# Patient Record
Sex: MALE | Race: WHITE | NOT HISPANIC OR LATINO | Employment: OTHER | URBAN - METROPOLITAN AREA
[De-identification: names, ages, dates, MRNs, and addresses within clinical notes are randomized per-mention and may not be internally consistent; named-entity substitution may affect disease eponyms.]

---

## 2018-03-13 LAB
HBA1C MFR BLD: 5.5 % (ref 0–5.7)
TSH SERPL-ACNC: 0.16 UIU/ML (ref 0.35–4.94)

## 2018-03-14 LAB
CHOLEST SERPL-MCNC: 133 MG/DL (ref 100–199)
HDLC SERPL-MCNC: 37 MG/DL
LDLC SERPL CALC-MCNC: 79 MG/DL
NONHDLC SERPL-MCNC: 96 MG/DL
TRIGL SERPL-MCNC: 86 MG/DL

## 2019-04-06 ENCOUNTER — TRANSFERRED RECORDS (OUTPATIENT)
Dept: MULTI SPECIALTY CLINIC | Facility: CLINIC | Age: 49
End: 2019-04-06

## 2019-04-06 LAB
ALT SERPL-CCNC: 11 IU/L (ref 8–45)
AST SERPL-CCNC: 16 IU/L (ref 2–40)
CREAT SERPL-MCNC: 1.74 MG/DL (ref 0.72–1.25)
GFR SERPL CREATININE-BSD FRML MDRD: 42 ML/MIN/1.73M2
GLUCOSE SERPL-MCNC: 123 MG/DL (ref 65–100)
INR PPP: 1.1
POTASSIUM SERPL-SCNC: 4.4 MMOL/L (ref 3.5–5)

## 2019-08-13 ENCOUNTER — NURSE TRIAGE (OUTPATIENT)
Dept: NURSING | Facility: CLINIC | Age: 49
End: 2019-08-13

## 2019-08-13 ENCOUNTER — TELEPHONE (OUTPATIENT)
Dept: FAMILY MEDICINE | Facility: CLINIC | Age: 49
End: 2019-08-13

## 2019-08-13 NOTE — TELEPHONE ENCOUNTER
Pt called requesting appt to fill his medications.  [facility] scheduled are full. Made pt appt at the [facility]. [facility].  Pt said he has had 2 strokes in the past and can't remember a lot. Pt did give me his Ex wifes number Bella [phone].  Pt said he did not have a number but was using a friends phone [phone]  Need records from other sites as to what meds he is on and medical History.    Pt said he gets his medications from [facility].  Justin Pharmacist said last medications filled with them was in January from [facility] [phone] & [phone]    [facility]

## 2019-08-13 NOTE — TELEPHONE ENCOUNTER
Bella returning a phone call from Chandrika from the clinic about filling his medications. They were able to get him an appointment tomorrow at 1240 and Bella will try very hard to make the appointment to be able to give a good history and fill in release of information papers for him. Bella gave her number and said that if they needed information or help with medical things they could call.    Lilia Osorio RN/ [facility]        Reason for Disposition    General information question, no triage required and triager able to answer question    Protocols used: INFORMATION ONLY CALL-A-

## 2019-08-15 ENCOUNTER — OFFICE VISIT (OUTPATIENT)
Dept: FAMILY MEDICINE | Facility: CLINIC | Age: 49
End: 2019-08-15
Payer: COMMERCIAL

## 2019-08-15 VITALS
SYSTOLIC BLOOD PRESSURE: 196 MMHG | TEMPERATURE: 98.2 F | WEIGHT: 186.8 LBS | BODY MASS INDEX: 26.74 KG/M2 | HEIGHT: 70 IN | OXYGEN SATURATION: 95 % | HEART RATE: 101 BPM | DIASTOLIC BLOOD PRESSURE: 104 MMHG | RESPIRATION RATE: 18 BRPM

## 2019-08-15 DIAGNOSIS — F32.A DEPRESSION, UNSPECIFIED DEPRESSION TYPE: ICD-10-CM

## 2019-08-15 DIAGNOSIS — Z86.73 HISTORY OF MULTIPLE STROKES: Primary | ICD-10-CM

## 2019-08-15 DIAGNOSIS — I10 POORLY-CONTROLLED HYPERTENSION: ICD-10-CM

## 2019-08-15 DIAGNOSIS — Q61.3 PKD (POLYCYSTIC KIDNEY DISEASE): ICD-10-CM

## 2019-08-15 PROBLEM — I63.9 ACUTE CVA (CEREBROVASCULAR ACCIDENT) (H): Status: ACTIVE | Noted: 2017-03-22

## 2019-08-15 LAB
ALBUMIN SERPL-MCNC: 3.5 G/DL (ref 3.4–5)
ALP SERPL-CCNC: 80 U/L (ref 40–150)
ALT SERPL W P-5'-P-CCNC: 19 U/L (ref 0–70)
ANION GAP SERPL CALCULATED.3IONS-SCNC: 5 MMOL/L (ref 3–14)
AST SERPL W P-5'-P-CCNC: 23 U/L (ref 0–45)
BILIRUB SERPL-MCNC: 0.5 MG/DL (ref 0.2–1.3)
BUN SERPL-MCNC: 31 MG/DL (ref 7–30)
CALCIUM SERPL-MCNC: 8.9 MG/DL (ref 8.5–10.1)
CHLORIDE SERPL-SCNC: 105 MMOL/L (ref 94–109)
CHOLEST SERPL-MCNC: 157 MG/DL
CO2 SERPL-SCNC: 27 MMOL/L (ref 20–32)
CREAT SERPL-MCNC: 1.95 MG/DL (ref 0.66–1.25)
ERYTHROCYTE [DISTWIDTH] IN BLOOD BY AUTOMATED COUNT: 13.1 % (ref 10–15)
GFR SERPL CREATININE-BSD FRML MDRD: 39 ML/MIN/{1.73_M2}
GLUCOSE SERPL-MCNC: 107 MG/DL (ref 70–99)
HCT VFR BLD AUTO: 50.1 % (ref 40–53)
HDLC SERPL-MCNC: 54 MG/DL
HGB BLD-MCNC: 16.9 G/DL (ref 13.3–17.7)
LDLC SERPL CALC-MCNC: 83 MG/DL
MCH RBC QN AUTO: 29.6 PG (ref 26.5–33)
MCHC RBC AUTO-ENTMCNC: 33.7 G/DL (ref 31.5–36.5)
MCV RBC AUTO: 88 FL (ref 78–100)
NONHDLC SERPL-MCNC: 103 MG/DL
PLATELET # BLD AUTO: 320 10E9/L (ref 150–450)
POTASSIUM SERPL-SCNC: 4.4 MMOL/L (ref 3.4–5.3)
PROT SERPL-MCNC: 7.6 G/DL (ref 6.8–8.8)
RBC # BLD AUTO: 5.71 10E12/L (ref 4.4–5.9)
SODIUM SERPL-SCNC: 137 MMOL/L (ref 133–144)
TRIGL SERPL-MCNC: 102 MG/DL
WBC # BLD AUTO: 9 10E9/L (ref 4–11)

## 2019-08-15 PROCEDURE — 80053 COMPREHEN METABOLIC PANEL: CPT | Performed by: FAMILY MEDICINE

## 2019-08-15 PROCEDURE — 85027 COMPLETE CBC AUTOMATED: CPT | Performed by: FAMILY MEDICINE

## 2019-08-15 PROCEDURE — 36415 COLL VENOUS BLD VENIPUNCTURE: CPT | Performed by: FAMILY MEDICINE

## 2019-08-15 PROCEDURE — 80061 LIPID PANEL: CPT | Performed by: FAMILY MEDICINE

## 2019-08-15 PROCEDURE — 99204 OFFICE O/P NEW MOD 45 MIN: CPT | Performed by: FAMILY MEDICINE

## 2019-08-15 RX ORDER — LISINOPRIL 40 MG/1
40 TABLET ORAL DAILY
Refills: 0 | COMMUNITY
Start: 2019-01-25 | End: 2019-08-15

## 2019-08-15 RX ORDER — ASPIRIN 81 MG/1
81 TABLET ORAL DAILY
Qty: 90 TABLET | Refills: 3 | Status: SHIPPED | OUTPATIENT
Start: 2019-08-15 | End: 2022-02-02

## 2019-08-15 RX ORDER — ESCITALOPRAM OXALATE 10 MG/1
10 TABLET ORAL DAILY
Qty: 90 TABLET | Refills: 1 | Status: SHIPPED | OUTPATIENT
Start: 2019-08-15 | End: 2021-05-21

## 2019-08-15 RX ORDER — CLOPIDOGREL BISULFATE 75 MG/1
75 TABLET ORAL DAILY
COMMUNITY
End: 2019-08-15

## 2019-08-15 RX ORDER — ASPIRIN 81 MG/1
81 TABLET ORAL DAILY
COMMUNITY
Start: 2017-04-23 | End: 2019-08-15

## 2019-08-15 RX ORDER — LISINOPRIL 40 MG/1
40 TABLET ORAL DAILY
Qty: 90 TABLET | Refills: 3 | Status: SHIPPED | OUTPATIENT
Start: 2019-08-15 | End: 2021-01-28

## 2019-08-15 RX ORDER — ATORVASTATIN CALCIUM 40 MG/1
1 TABLET, FILM COATED ORAL EVERY EVENING
Refills: 0 | COMMUNITY
Start: 2019-01-25 | End: 2019-08-15

## 2019-08-15 RX ORDER — AMLODIPINE BESYLATE 5 MG/1
10 TABLET ORAL EVERY EVENING
Refills: 0 | COMMUNITY
Start: 2019-01-25 | End: 2019-08-15

## 2019-08-15 RX ORDER — AMLODIPINE BESYLATE 5 MG/1
10 TABLET ORAL EVERY EVENING
Qty: 180 TABLET | Refills: 3 | Status: CANCELLED | OUTPATIENT
Start: 2019-08-15

## 2019-08-15 RX ORDER — CLOPIDOGREL BISULFATE 75 MG/1
75 TABLET ORAL DAILY
Qty: 90 TABLET | Refills: 3 | Status: SHIPPED | OUTPATIENT
Start: 2019-08-15 | End: 2021-01-28

## 2019-08-15 RX ORDER — CARVEDILOL 25 MG/1
25 TABLET ORAL 2 TIMES DAILY
Qty: 90 TABLET | Refills: 3 | Status: SHIPPED | OUTPATIENT
Start: 2019-08-15 | End: 2019-08-15

## 2019-08-15 RX ORDER — CARVEDILOL 25 MG/1
25 TABLET ORAL 2 TIMES DAILY
Qty: 90 TABLET | Refills: 1
Start: 2019-08-15 | End: 2021-01-28

## 2019-08-15 RX ORDER — AMLODIPINE BESYLATE 10 MG/1
10 TABLET ORAL DAILY
Qty: 90 TABLET | Refills: 1 | Status: SHIPPED | OUTPATIENT
Start: 2019-08-15 | End: 2020-03-24

## 2019-08-15 RX ORDER — ATORVASTATIN CALCIUM 40 MG/1
40 TABLET, FILM COATED ORAL EVERY EVENING
Qty: 90 TABLET | Refills: 3 | Status: SHIPPED | OUTPATIENT
Start: 2019-08-15 | End: 2021-01-28

## 2019-08-15 RX ORDER — CARVEDILOL 25 MG/1
25 TABLET ORAL 2 TIMES DAILY
COMMUNITY
Start: 2019-01-25 | End: 2019-08-15

## 2019-08-15 ASSESSMENT — PATIENT HEALTH QUESTIONNAIRE - PHQ9: SUM OF ALL RESPONSES TO PHQ QUESTIONS 1-9: 10

## 2019-08-15 ASSESSMENT — MIFFLIN-ST. JEOR: SCORE: 1718.57

## 2019-08-15 NOTE — NURSING NOTE
"Chief Complaint   Patient presents with     Cerebrovascular Accident     Establish Care       Initial BP (!) 196/104   Pulse 101   Temp 98.2  F (36.8  C)   Resp 18   Ht 1.778 m (5' 10\")   Wt 84.7 kg (186 lb 12.8 oz)   SpO2 95%   BMI 26.80 kg/m   Estimated body mass index is 26.8 kg/m  as calculated from the following:    Height as of this encounter: 1.778 m (5' 10\").    Weight as of this encounter: 84.7 kg (186 lb 12.8 oz).    Patient presents to the clinic using No DME    Health Maintenance that is potentially due pending provider review:  Lipid profile done 3/4/2018    Sent to abstracting found on care everywhere.    Is there anyone who you would like to be able to receive your results? Yes  If yes have patient fill out CAROLYN    "

## 2019-08-15 NOTE — Clinical Note
Please abstract the following data from this visit with this patient into the appropriate field in Epic:Lipid profile done 3/4/2018 found on care everywhere

## 2019-08-15 NOTE — LETTER
August 16, 2019      Salty Beasley  [address]  [place] [facility] [address]        Dear ,    We are writing to inform you of your test results.    Kidney function at around baseline low.  Other lab results came back unremarkable. Would recommend to continue current medications.  Continue well hydration. Let us know if there are any questions.     Resulted Orders   CBC with platelets   Result Value Ref Range    WBC 9.0 4.0 - 11.0 10e9/L    RBC Count 5.71 4.4 - 5.9 10e12/L    Hemoglobin 16.9 13.3 - 17.7 g/dL    Hematocrit 50.1 40.0 - 53.0 %    MCV 88 78 - 100 fl    MCH 29.6 26.5 - 33.0 pg    MCHC 33.7 31.5 - 36.5 g/dL    RDW 13.1 10.0 - 15.0 %    Platelet Count 320 150 - 450 10e9/L   Comprehensive metabolic panel (BMP + Alb, Alk Phos, ALT, AST, Total. Bili, TP)   Result Value Ref Range    Sodium 137 133 - 144 mmol/L    Potassium 4.4 3.4 - 5.3 mmol/L    Chloride 105 94 - 109 mmol/L    Carbon Dioxide 27 20 - 32 mmol/L    Anion Gap 5 3 - 14 mmol/L    Glucose 107 (H) 70 - 99 mg/dL      Comment:      Fasting specimen    Urea Nitrogen 31 (H) 7 - 30 mg/dL    Creatinine 1.95 (H) 0.66 - 1.25 mg/dL    GFR Estimate 39 (L) >60 mL/min/[1.73_m2]      Comment:      Non  GFR Calc  Starting 12/18/2018, serum creatinine based estimated GFR (eGFR) will be   calculated using the Chronic Kidney Disease Epidemiology Collaboration   (CKD-EPI) equation.      GFR Estimate If Black 45 (L) >60 mL/min/[1.73_m2]      Comment:       GFR Calc  Starting 12/18/2018, serum creatinine based estimated GFR (eGFR) will be   calculated using the Chronic Kidney Disease Epidemiology Collaboration   (CKD-EPI) equation.      Calcium 8.9 8.5 - 10.1 mg/dL    Bilirubin Total 0.5 0.2 - 1.3 mg/dL    Albumin 3.5 3.4 - 5.0 g/dL    Protein Total 7.6 6.8 - 8.8 g/dL    Alkaline Phosphatase 80 40 - 150 U/L    ALT 19 0 - 70 U/L    AST 23 0 - 45 U/L   Lipid panel   Result Value Ref Range    Cholesterol 157 <200 mg/dL    Triglycerides 102  <150 mg/dL      Comment:      Fasting specimen    HDL Cholesterol 54 >39 mg/dL    LDL Cholesterol Calculated 83 <100 mg/dL      Comment:      Desirable:       <100 mg/dl    Non HDL Cholesterol 103 <130 mg/dL       If you have any questions or concerns, please call the clinic at the number listed above.       Sincerely,        Jacobo Bradford MD/[facility]

## 2019-08-15 NOTE — NURSING NOTE
"Chief Complaint   Patient presents with     Cerebrovascular Accident     Establish Care       Initial BP (!) 196/104   Pulse 101   Temp 98.2  F (36.8  C)   Resp 18   Ht 1.778 m (5' 10\")   Wt 84.7 kg (186 lb 12.8 oz)   SpO2 95%   BMI 26.80 kg/m   Estimated body mass index is 26.8 kg/m  as calculated from the following:    Height as of this encounter: 1.778 m (5' 10\").    Weight as of this encounter: 84.7 kg (186 lb 12.8 oz).    Patient presents to the clinic using No DME    Health Maintenance that is potentially due pending provider review:  NONE    n/a    Is there anyone who you would like to be able to receive your results? yes  If yes have patient fill out CAROLYN    "

## 2019-08-15 NOTE — PROGRESS NOTES
SUBJECTIVE   Salty Beasley is a 48 year old male who presents with     New Patient/Transfer of Care  Cerebrovascular Follow-up      Patient history: ischemic cerebrovascular incident    Residual symptoms: Difficult walking, Confusion, Difficulty speaking, Slurred speech and crying spells    Worsened or new symptoms since last visit: No    Daily aspirin use: No, he used to take an aspirin daily but has run out 1 week ago    Hypertension controlled: No ran out of medication 1+ week ago        How many servings of fruits and vegetables do you eat daily?  2-3    On average, how many sweetened beverages do you drink each day (soda, juice, sweet tea, etc)?   0  How many days per week do you miss taking your medication? Ran out of medication 1 + week ago    What makes it hard for you to take your medications?  because could not get refilled until had a visit     Patient lost his home 1 year ago and now is living in a camper on a piece of property that he had already paid for    Does not have a cell phone    Does not drive, got a ride from brother    Does not have a medidametrics worker, has been trying to live on the money he has had  Head CT 7/12/2017 done a [facility]:  consistent with small vessel ischemic disease as well as old bilateral basal ganglia lacunar infarcts      Multiple strokes was recorded due to poorly controlled hypertension as per neurology.  He had carotid US, Echocardiogram with bubble study and normal cardiac anatomy, no ASD or VSD.         PCP   No primary care provider on file. None    Health Maintenance        Health Maintenance Due   Topic Date Due     PREVENTIVE CARE VISIT  1969     HIV SCREENING  11/07/1984     LIPID  11/07/2004     PHQ-2  01/01/2019       HPI        Patient Active Problem List   Diagnosis     Acute CVA (cerebrovascular accident) (H)     Current Outpatient Medications   Medication     amLODIPine (NORVASC) 10 MG tablet     amLODIPine (NORVASC) 5 MG tablet     aspirin 81 MG EC  tablet     atorvastatin (LIPITOR) 40 MG tablet     carvedilol (COREG) 25 MG tablet     clopidogrel (PLAVIX) 75 MG tablet     IBUPROFEN 200 200 MG OR TABS     lisinopril (PRINIVIL/ZESTRIL) 40 MG tablet     No current facility-administered medications for this visit.        Patient Active Problem List   Diagnosis     Acute CVA (cerebrovascular accident) (H)     Past Surgical History:   Procedure Laterality Date     SURGICAL HISTORY OF -       rt fx ankle age 11       Social History     Tobacco Use     Smoking status: Never Smoker     Smokeless tobacco: Never Used   Substance Use Topics     Alcohol use: Not Currently     Comment: occ     Family History   Problem Relation Age of Onset     Genitourinary Problems Mother         polycystic kidney     Hypertension Mother      Diabetes Mother      Heart Disease Mother         CHF     Cancer Father         bladder cancer     Heart Disease Maternal Grandmother      Cerebrovascular Disease Maternal Grandfather      Breast Cancer Paternal Grandmother      Hypertension Paternal Grandmother      Diabetes Paternal Grandmother          Current Outpatient Medications   Medication Sig Dispense Refill     amLODIPine (NORVASC) 10 MG tablet Take 1 tablet (10 mg) by mouth daily 90 tablet 1     amLODIPine (NORVASC) 5 MG tablet 10 mg every evening  0     aspirin 81 MG EC tablet Take 1 tablet (81 mg) by mouth daily 90 tablet 3     atorvastatin (LIPITOR) 40 MG tablet Take 1 tablet (40 mg) by mouth every evening 90 tablet 3     carvedilol (COREG) 25 MG tablet Take 1 tablet (25 mg) by mouth 2 times daily 90 tablet 3     clopidogrel (PLAVIX) 75 MG tablet Take 1 tablet (75 mg) by mouth daily 90 tablet 3     IBUPROFEN 200 200 MG OR TABS 3 tabs q 6-8 hrs prn       lisinopril (PRINIVIL/ZESTRIL) 40 MG tablet Take 1 tablet (40 mg) by mouth daily 90 tablet 3     No Known Allergies  No lab results found.   BP Readings from Last 3 Encounters:   08/15/19 (!) 196/104   01/22/08 130/90    Wt Readings from  "Last 3 Encounters:   08/15/19 84.7 kg (186 lb 12.8 oz)   01/22/08 74.4 kg (164 lb)                 Reviewed and updated:  Tobacco  Allergies  Meds  Med Hx  Surg Hx  Fam Hx  Soc Hx     ROS:  Constitutional, neuro, ENT, endocrine, pulmonary, cardiac, gastrointestinal, genitourinary, musculoskeletal, integument and psychiatric systems are negative, except as otherwise noted.    PHYSICAL EXAM   BP (!) 196/104   Pulse 101   Temp 98.2  F (36.8  C)   Resp 18   Ht 1.778 m (5' 10\")   Wt 84.7 kg (186 lb 12.8 oz)   SpO2 95%   BMI 26.80 kg/m    Body mass index is 26.8 kg/m .  GENERAL: alert and over weight  EYES: Eyes grossly normal to inspection, PERRL and conjunctivae and sclerae normal  NECK: no adenopathy, no asymmetry, masses, or scars and thyroid normal to palpation  RESP: lungs clear to auscultation - no rales, rhonchi or wheezes  CV: regular rate and rhythm, normal S1 S2, no S3 or S4, no murmur, click or rub, no peripheral edema and peripheral pulses strong  ABDOMEN: soft, nontender, no hepatosplenomegaly, no masses and bowel sounds normal  MS: no gross musculoskeletal defects noted, no edema  SKIN: no suspicious lesions or rashes  NEURO: Normal strength and tone, mentation intact and speech normal  PSYCH: tearful and anxious    Assessment & Plan     (Z86.73) History of multiple strokes  (primary encounter diagnosis)  Comment: Continue Plavix, aspirin and Lipitor.  Neurology referral placed  Plan: clopidogrel (PLAVIX) 75 MG tablet, atorvastatin        (LIPITOR) 40 MG tablet, aspirin 81 MG EC         tablet, NEUROLOGY ADULT REFERRAL            (I10) Poorly-controlled hypertension  Comment: Ran out of medications about a week ago, better compliance stressed.  Lisinopril, amlodipine and carvedilol refilled. Healthy lifestyle modifications stressed including regular walks, balanced diet, weight loss and limiting salt/caffeine/pop intake  Plan: lisinopril (PRINIVIL/ZESTRIL) 40 MG tablet,         amLODIPine " "(NORVASC) 10 MG tablet, CBC with         platelets, Comprehensive metabolic panel (BMP +        Alb, Alk Phos, ALT, AST, Total. Bili, TP),         carvedilol (COREG) 25 MG tablet, Lipid panel,         DISCONTINUED: carvedilol (COREG) 25 MG tablet            (Q61.3) PKD (polycystic kidney disease)  Comment: Known to have polycystic kidney disease, CKD stage III.  Nephrology consult placed, will continue monitoring renal functions periodically  Plan: NEPHROLOGY ADULT REFERRAL      (F32.9) Depression, unspecified depression type  PHQ-9 SCORE 8/15/2019   PHQ-9 Total Score 10     Comment: Appears quite anxious and tearful, PHQ 9 score 10.  Suggested to try Lexapro, common side effects discussed  Plan: escitalopram (LEXAPRO) 10 MG tablet            BMI:   Estimated body mass index is 26.8 kg/m  as calculated from the following:    Height as of this encounter: 1.778 m (5' 10\").    Weight as of this encounter: 84.7 kg (186 lb 12.8 oz).   Weight management plan: Discussed healthy diet and exercise guidelines        Patient Instructions     Patient Education     Low-Salt Choices  Eating salt (sodium) can make your body retain too much water. Excess water makes your heart work harder. Canned, packaged, and frozen foods are easy to prepare. But they are often high in sodium. Here are some ideas for low-salt foods you can easily make yourself.    For breakfast    Fruit or 100% fruit juice    Whole-wheat bread or an English muffin. Look for sodium content on Nutrition Facts labels.    Low-fat milk or yogurt    Unsalted eggs    Shredded wheat    Corn tortillas    Unsalted steamed rice    Regular (not instant) hot cereal, made without salt  Stay away from:    Sausage, morales, and ham    Flour tortillas    Packaged muffins, pancakes, and biscuits    Instant hot cereals    Cottage cheese  For lunch and dinner    Fresh fish, chicken, turkey, or meat--baked, broiled, or roasted without salt    Dry beans, cooked without salt    Tofu, " stir-fried without salt    Unsalted fresh fruit and vegetables, or frozen or canned fruit and vegetables with no added salt  Stay away from:    Lunch or deli meat that is cured or smoked    Cheese    Tomato juice and ketchup    Canned vegetables, soups, and fish not labeled as no-salt-added or reduced sodium    Packaged gravies and sauces    Olives, pickles, and relish    Bottled salad dressings  For snacks and desserts    Yogurt    Unsalted, air-popped popcorn    Unsalted nuts or seeds  Stay away from:    Pies and cakes    Packaged dessert mixes    Pizza    Canned and packaged puddings    Pretzels, chips, crackers, and nuts--unless the label says unsalted  Date Last Reviewed: 6/1/2017 2000-2018 [employer]. [address] [place] [address]. All rights reserved. This information is not intended as a substitute for professional medical care. Always follow your healthcare professional's instructions.           Patient Education     Depression  Depression is one of the most common mental health problems today. It is not just a state of unhappiness or sadness. It is a true disease. The cause seems to be related to a decrease in chemicals that transmit signals in the brain. Having a family history of depression, alcoholism, or suicide increases the risk. Chronic illness, chronic pain, migraine headaches, and high emotional stress also increase the risk.  Depression is something we tend to recognize in others, but may have a hard time seeing in ourselves. It can show in many physical and emotional ways:    Loss of appetite    Overeating    Not being able to sleep    Sleeping too much    Tiredness not related to physical exertion    Restlessness or irritability    Slowness of movement or speech    Feeling depressed or withdrawn    Loss of interest in things you once enjoyed    Trouble concentrating, poor memory, trouble making decisions    Thoughts of harming or killing oneself, or thoughts that  life is not worth living    Low self-esteem  The treatment for depression may include both medicine and psychotherapy. Antidepressants can reduce suffering and can improve the ability to function during the depressed period. Therapy can offer emotional support and help you understand emotional factors that may be causing the depression.  Home care    Ongoing care and support help people manage this disease. Find a healthcare provider and therapist who meet your needs. Seek help when you feel like you may be getting ill.    Be kind to yourself. Make it a point to do things that you enjoy (gardening, walking in nature, going to a movie). Reward yourself for small successes.    Take care of your physical body. Eat a balanced diet (low in saturated fat and high in fruits and vegetables). Exercise at least 3 times a week for 30 minutes. Even mild-moderate exercise (like brisk walking) can make you feel better.    Don't drink alcohol, which can make depression worse.    Take medicine as prescribed.    Tell each of your healthcare providers about all of the prescription and over-the-counter medicines, vitamins, and supplements you take. Certain supplements interact with medicines and can result in dangerous side effects. Ask your pharmacist when you have questions about medicine interactions.    Talk with your family and trusted friends about your feelings and thoughts. Ask them to help you recognize behavior changes early so you can get help and, if needed, medicine can be adjusted.  Follow-up care  Follow up with your healthcare provider, or as advised.  Call 911  Call 911 if you:    Have suicidal thoughts, a suicide plan, and the means to carry out the plan; or serious thoughts of hurting someone else     Have trouble breathing    Are very confused    Feel very drowsy or have trouble awakening    Faint or lose consciousness    Have new chest pain that becomes more severe, lasts longer, or spreads into your shoulder,  arm, neck, jaw, or back  When to seek medical advice  Call your healthcare provider right away if any of these happen:    Feeling extreme depression, fear, anxiety, or anger toward yourself or others    Feeling out of control    Feeling that you may try to harm yourself or another    Hearing voices that others do not hear    Seeing things that others do not see    Can t sleep or eat for 3 days in a row    Friends or family express concern over your behavior and ask you to seek help  Date Last Reviewed: 10/1/2017    8339-1157 [employer]. [address] [place] [address]. All rights reserved. This information is not intended as a substitute for professional medical care. Always follow your healthcare professional's instructions.               Return in about 2 weeks (around 8/29/2019) for BP Recheck.        Jacobo Bradford MD  [facility]

## 2019-08-15 NOTE — PATIENT INSTRUCTIONS
Patient Education     Low-Salt Choices  Eating salt (sodium) can make your body retain too much water. Excess water makes your heart work harder. Canned, packaged, and frozen foods are easy to prepare. But they are often high in sodium. Here are some ideas for low-salt foods you can easily make yourself.    For breakfast    Fruit or 100% fruit juice    Whole-wheat bread or an English muffin. Look for sodium content on Nutrition Facts labels.    Low-fat milk or yogurt    Unsalted eggs    Shredded wheat    Corn tortillas    Unsalted steamed rice    Regular (not instant) hot cereal, made without salt  Stay away from:    Sausage, morales, and ham    Flour tortillas    Packaged muffins, pancakes, and biscuits    Instant hot cereals    Cottage cheese  For lunch and dinner    Fresh fish, chicken, turkey, or meat--baked, broiled, or roasted without salt    Dry beans, cooked without salt    Tofu, stir-fried without salt    Unsalted fresh fruit and vegetables, or frozen or canned fruit and vegetables with no added salt  Stay away from:    Lunch or deli meat that is cured or smoked    Cheese    Tomato juice and ketchup    Canned vegetables, soups, and fish not labeled as no-salt-added or reduced sodium    Packaged gravies and sauces    Olives, pickles, and relish    Bottled salad dressings  For snacks and desserts    Yogurt    Unsalted, air-popped popcorn    Unsalted nuts or seeds  Stay away from:    Pies and cakes    Packaged dessert mixes    Pizza    Canned and packaged puddings    Pretzels, chips, crackers, and nuts--unless the label says unsalted  Date Last Reviewed: 6/1/2017 2000-2018 [employer]. [address] [place] [address], [place] [address]. All rights reserved. This information is not intended as a substitute for professional medical care. Always follow your healthcare professional's instructions.           Patient Education     Depression  Depression is one of the most common mental health problems today.  It is not just a state of unhappiness or sadness. It is a true disease. The cause seems to be related to a decrease in chemicals that transmit signals in the brain. Having a family history of depression, alcoholism, or suicide increases the risk. Chronic illness, chronic pain, migraine headaches, and high emotional stress also increase the risk.  Depression is something we tend to recognize in others, but may have a hard time seeing in ourselves. It can show in many physical and emotional ways:    Loss of appetite    Overeating    Not being able to sleep    Sleeping too much    Tiredness not related to physical exertion    Restlessness or irritability    Slowness of movement or speech    Feeling depressed or withdrawn    Loss of interest in things you once enjoyed    Trouble concentrating, poor memory, trouble making decisions    Thoughts of harming or killing oneself, or thoughts that life is not worth living    Low self-esteem  The treatment for depression may include both medicine and psychotherapy. Antidepressants can reduce suffering and can improve the ability to function during the depressed period. Therapy can offer emotional support and help you understand emotional factors that may be causing the depression.  Home care    Ongoing care and support help people manage this disease. Find a healthcare provider and therapist who meet your needs. Seek help when you feel like you may be getting ill.    Be kind to yourself. Make it a point to do things that you enjoy (gardening, walking in nature, going to a movie). Reward yourself for small successes.    Take care of your physical body. Eat a balanced diet (low in saturated fat and high in fruits and vegetables). Exercise at least 3 times a week for 30 minutes. Even mild-moderate exercise (like brisk walking) can make you feel better.    Don't drink alcohol, which can make depression worse.    Take medicine as prescribed.    Tell each of your healthcare providers  about all of the prescription and over-the-counter medicines, vitamins, and supplements you take. Certain supplements interact with medicines and can result in dangerous side effects. Ask your pharmacist when you have questions about medicine interactions.    Talk with your family and trusted friends about your feelings and thoughts. Ask them to help you recognize behavior changes early so you can get help and, if needed, medicine can be adjusted.  Follow-up care  Follow up with your healthcare provider, or as advised.  Call 911  Call 911 if you:    Have suicidal thoughts, a suicide plan, and the means to carry out the plan; or serious thoughts of hurting someone else     Have trouble breathing    Are very confused    Feel very drowsy or have trouble awakening    Faint or lose consciousness    Have new chest pain that becomes more severe, lasts longer, or spreads into your shoulder, arm, neck, jaw, or back  When to seek medical advice  Call your healthcare provider right away if any of these happen:    Feeling extreme depression, fear, anxiety, or anger toward yourself or others    Feeling out of control    Feeling that you may try to harm yourself or another    Hearing voices that others do not hear    Seeing things that others do not see    Can t sleep or eat for 3 days in a row    Friends or family express concern over your behavior and ask you to seek help  Date Last Reviewed: 10/1/2017    [id number] [employer]. [address] [place] [address], [place] [address]. All rights reserved. This information is not intended as a substitute for professional medical care. Always follow your healthcare professional's instructions.

## 2019-08-29 ENCOUNTER — OFFICE VISIT (OUTPATIENT)
Dept: FAMILY MEDICINE | Facility: CLINIC | Age: 49
End: 2019-08-29
Payer: COMMERCIAL

## 2019-08-29 VITALS
TEMPERATURE: 97.8 F | SYSTOLIC BLOOD PRESSURE: 170 MMHG | HEIGHT: 70 IN | RESPIRATION RATE: 18 BRPM | BODY MASS INDEX: 26.63 KG/M2 | DIASTOLIC BLOOD PRESSURE: 110 MMHG | WEIGHT: 186 LBS | HEART RATE: 92 BPM

## 2019-08-29 DIAGNOSIS — Q61.3 PKD (POLYCYSTIC KIDNEY DISEASE): ICD-10-CM

## 2019-08-29 DIAGNOSIS — I10 POORLY-CONTROLLED HYPERTENSION: Primary | ICD-10-CM

## 2019-08-29 DIAGNOSIS — F15.10 DRUG ABUSE, AMPHETAMINE TYPE (H): ICD-10-CM

## 2019-08-29 DIAGNOSIS — N18.30 CKD (CHRONIC KIDNEY DISEASE) STAGE 3, GFR 30-59 ML/MIN (H): ICD-10-CM

## 2019-08-29 DIAGNOSIS — F32.A DEPRESSION, UNSPECIFIED DEPRESSION TYPE: ICD-10-CM

## 2019-08-29 LAB
ANION GAP SERPL CALCULATED.3IONS-SCNC: 5 MMOL/L (ref 3–14)
BUN SERPL-MCNC: 41 MG/DL (ref 7–30)
CALCIUM SERPL-MCNC: 9.2 MG/DL (ref 8.5–10.1)
CHLORIDE SERPL-SCNC: 106 MMOL/L (ref 94–109)
CO2 SERPL-SCNC: 28 MMOL/L (ref 20–32)
CREAT SERPL-MCNC: 1.86 MG/DL (ref 0.66–1.25)
GFR SERPL CREATININE-BSD FRML MDRD: 41 ML/MIN/{1.73_M2}
GLUCOSE SERPL-MCNC: 108 MG/DL (ref 70–99)
POTASSIUM SERPL-SCNC: 4.3 MMOL/L (ref 3.4–5.3)
SODIUM SERPL-SCNC: 139 MMOL/L (ref 133–144)

## 2019-08-29 PROCEDURE — 36415 COLL VENOUS BLD VENIPUNCTURE: CPT | Performed by: FAMILY MEDICINE

## 2019-08-29 PROCEDURE — 80048 BASIC METABOLIC PNL TOTAL CA: CPT | Performed by: FAMILY MEDICINE

## 2019-08-29 PROCEDURE — 99214 OFFICE O/P EST MOD 30 MIN: CPT | Performed by: FAMILY MEDICINE

## 2019-08-29 RX ORDER — CLONIDINE HYDROCHLORIDE 0.1 MG/1
TABLET ORAL
Qty: 28 TABLET | Refills: 1 | Status: SHIPPED | OUTPATIENT
Start: 2019-08-29 | End: 2020-03-24

## 2019-08-29 ASSESSMENT — MIFFLIN-ST. JEOR: SCORE: 1714.94

## 2019-08-29 NOTE — NURSING NOTE
"Chief Complaint   Patient presents with     Hypertension     BP (!) 170/110 (Cuff Size: Adult Regular)   Pulse 92   Temp 97.8  F (36.6  C) (Tympanic)   Resp 18   Ht 1.778 m (5' 10\")   Wt 84.4 kg (186 lb)   BMI 26.69 kg/m   Estimated body mass index is 26.69 kg/m  as calculated from the following:    Height as of this encounter: 1.778 m (5' 10\").    Weight as of this encounter: 84.4 kg (186 lb).  Patient presents to the clinic using No DME      Health Maintenance that is potentially due pending provider review:    Health Maintenance Due   Topic Date Due     PREVENTIVE CARE VISIT  1970     DEPRESSION ACTION PLAN  1970     HIV SCREENING  11/07/1984                "

## 2019-08-29 NOTE — PROGRESS NOTES
SUBJECTIVE   Salty Beasley is a 48 year old male who presents with     Hypertension Follow-up      Do you check your blood pressure regularly outside of the clinic? Yes     Are you following a low salt diet? Yes    Are your blood pressures ever more than 140 on the top number (systolic) OR more   than 90 on the bottom number (diastolic), for example 140/90? Yes  180/160- says his life is a mess right now. Lots of stuff going on.         How many days per week do you miss taking your medication? 0        PCP   Physician No Ref-Primary None    Health Maintenance        Health Maintenance Due   Topic Date Due     PREVENTIVE CARE VISIT  1970     DEPRESSION ACTION PLAN  1969     HIV SCREENING  11/07/1984       HPI        Patient Active Problem List   Diagnosis     Acute CVA (cerebrovascular accident) (H)     Poorly-controlled hypertension     History of multiple strokes     PKD (polycystic kidney disease)     Depression, unspecified depression type     Current Outpatient Medications   Medication     amLODIPine (NORVASC) 10 MG tablet     aspirin 81 MG EC tablet     atorvastatin (LIPITOR) 40 MG tablet     carvedilol (COREG) 25 MG tablet     clopidogrel (PLAVIX) 75 MG tablet     escitalopram (LEXAPRO) 10 MG tablet     IBUPROFEN 200 200 MG OR TABS     lisinopril (PRINIVIL/ZESTRIL) 40 MG tablet     No current facility-administered medications for this visit.        Patient Active Problem List   Diagnosis     Acute CVA (cerebrovascular accident) (H)     Poorly-controlled hypertension     History of multiple strokes     PKD (polycystic kidney disease)     Depression, unspecified depression type     CKD (chronic kidney disease) stage 3, GFR 30-59 ml/min (H)     Past Surgical History:   Procedure Laterality Date     SURGICAL HISTORY OF -       rt fx ankle age 11       Social History     Tobacco Use     Smoking status: Never Smoker     Smokeless tobacco: Never Used   Substance Use Topics     Alcohol use: Not Currently      Comment: occ     Family History   Problem Relation Age of Onset     Genitourinary Problems Mother         polycystic kidney     Hypertension Mother      Diabetes Mother      Heart Disease Mother         CHF     Cancer Father         bladder cancer     Heart Disease Maternal Grandmother      Cerebrovascular Disease Maternal Grandfather      Breast Cancer Paternal Grandmother      Hypertension Paternal Grandmother      Diabetes Paternal Grandmother          Current Outpatient Medications   Medication Sig Dispense Refill     amLODIPine (NORVASC) 10 MG tablet Take 1 tablet (10 mg) by mouth daily 90 tablet 1     aspirin 81 MG EC tablet Take 1 tablet (81 mg) by mouth daily 90 tablet 3     atorvastatin (LIPITOR) 40 MG tablet Take 1 tablet (40 mg) by mouth every evening 90 tablet 3     carvedilol (COREG) 25 MG tablet Take 1 tablet (25 mg) by mouth 2 times daily 90 tablet 1     cloNIDine (CATAPRES) 0.1 MG tablet Take 1 tablet (0.1 mg) by mouth twice daily.  Increase to 2 tablets (0.2 mg) twice daily if blood pressure not below 140/90. 28 tablet 1     clopidogrel (PLAVIX) 75 MG tablet Take 1 tablet (75 mg) by mouth daily 90 tablet 3     escitalopram (LEXAPRO) 10 MG tablet Take 1 tablet (10 mg) by mouth daily 90 tablet 1     lisinopril (PRINIVIL/ZESTRIL) 40 MG tablet Take 1 tablet (40 mg) by mouth daily 90 tablet 3     No Known Allergies  Recent Labs   Lab Test 08/15/19  1510 04/06/19 03/14/18 03/13/18   A1C  --   --   --  5.5   LDL 83  --  79  --    HDL 54  --  37*  --    TRIG 102  --  86  --    ALT 19 11  --   --    CR 1.95* 1.74*  --   --    GFRESTIMATED 39* 42*  --   --    GFRESTBLACK 45* 51*  --   --    POTASSIUM 4.4 4.4  --   --    TSH  --   --   --  0.16*      BP Readings from Last 3 Encounters:   08/29/19 (!) 170/110   08/15/19 (!) 196/104   01/22/08 130/90    Wt Readings from Last 3 Encounters:   08/29/19 84.4 kg (186 lb)   08/15/19 84.7 kg (186 lb 12.8 oz)   01/22/08 74.4 kg (164 lb)                    Reviewed  "and updated:  Tobacco  Allergies  Meds  Med Hx  Surg Hx  Fam Hx  Soc Hx     ROS:  Constitutional, neuro, ENT, endocrine, pulmonary, cardiac, gastrointestinal, genitourinary, musculoskeletal, integument and psychiatric systems are negative, except as otherwise noted.    PHYSICAL EXAM   BP (!) 170/110 (Cuff Size: Adult Regular)   Pulse 92   Temp 97.8  F (36.6  C) (Tympanic)   Resp 18   Ht 1.778 m (5' 10\")   Wt 84.4 kg (186 lb)   BMI 26.69 kg/m    Body mass index is 26.69 kg/m .  GENERAL: alert and no distress  EYES: Eyes grossly normal to inspection, PERRL and conjunctivae and sclerae normal  HENT: normal cephalic/atraumatic, nose and mouth without ulcers or lesions, oropharynx clear and oral mucous membranes moist  NECK: no adenopathy, no asymmetry, masses, or scars and thyroid normal to palpation  RESP: lungs clear to auscultation - no rales, rhonchi or wheezes  CV: regular rates and rhythm, normal S1 S2, no S3 or S4 and no murmur, click or rub  ABDOMEN: soft, nontender, no hepatosplenomegaly, no masses and bowel sounds normal  SKIN: no suspicious lesions or rashes  NEURO: some pressured speech, no focal neurology noted, cranial nerves II through XII intact    Assessment & Plan     (I10) Poorly-controlled hypertension  (primary encounter diagnosis)  Comment: Blood pressure still above target goal of less than 140/90.  Past medical history significant for multiple comorbidities including polycystic kidney disease and chronic kidney disease stage III.  Patient admitted using methamphetamine about a week ago, urine drug screen was positive for methamphetamine, benzodiazepine, hydromorphone and marijuana back in June 2017.  Psychiatrist referral placed and stressed on getting counseling as well.  Clonidine prescribed for poorly controlled blood pressure, common side effects discussed.  Suggested to continue amlodipine 10 mg daily, carvedilol 25 mg twice daily and lisinopril 40 mg.  Stressed on regular " "exercise, balanced diet and weight loss  Plan: cloNIDine (CATAPRES) 0.1 MG tablet, Basic         metabolic panel         (N18.3) CKD (chronic kidney disease) stage 3, GFR 30-59 ml/min (H)  Comment: Patient here to schedule appointment with nephrology, reminded to call for an appointment.  BMP ordered to monitor electrolytes and renal function      (Q61.3) PKD (polycystic kidney disease)  Comment: As above      (F15.10) Drug abuse, amphetamine type (H)  Comment:   Plan: MENTAL HEALTH REFERRAL  - Adult; Psychiatry and        Medication Management; Psychiatry; Other:         [facility] [phone];        We will contact you to schedule the appointment        or please call with any questions       Follow-up with RN in 2 weeks for repeat blood pressure check.  Patient has been reminded to schedule an appointment with neurologist as well.         BMI:   Estimated body mass index is 26.69 kg/m  as calculated from the following:    Height as of this encounter: 1.778 m (5' 10\").    Weight as of this encounter: 84.4 kg (186 lb).   Weight management plan: Discussed healthy diet and exercise guidelines        Patient Instructions     Patient Education   High Blood Pressure and Chronic Kidney Disease (CKD)  What is high blood pressure?  For CKD patients, a normal blood pressure is 120/80 (or \"120 over 80\"). When blood pressure stays at 130/80 or higher, it is called high blood pressure (hypertension).  How are kidney disease and high blood pressure related?    More than half of the patients who have chronic kidney disease also have high blood pressure.    High blood pressure increases the chance that kidney disease will get worse.    High blood pressure is a major cause of CKD.   Damaged blood vessels send less blood to the important organs in your body, including your kidneys. Your kidneys filter waste from your blood. When your kidneys can't clean your blood as well as they should, this waste builds " up in your body.    CKD can also cause high blood pressure. Your kidneys help keep your blood pressure in a healthy range. Controlling your blood pressure will keep your kidneys from getting worse. This will make CKD easier for you and your doctor to manage.  How do I treat high blood pressure and CKD?    Your doctor will give you blood pressure goals to meet and show you how to check your blood pressure at home.  ? It is important that you check your pressure as directed. High blood pressure often has no symptoms.  ? Make sure to write down the date, time and result of your readings.  ? If you take blood pressure medicine, take it before you measure blood pressure. This helps us know if your medicine is working the way it should.    Stop smoking.    Follow your diet and exercise plan.    Take all medicines as directed.  ? If you have kidney disease from diabetes or if you have protein in your urine, the best blood pressure medicines for your treatment are angiotensin converting enzyme (ACE) inhibitors or angiotensin receptor blockers (ARB).  ? If you have any side effects from your blood pressure medicine, tell your doctor. He or she may switch you to a different medicine.  How often should I see my doctor?    Your CKD team will outline a treatment plan for you after you are diagnosed. Most patients come to the clinic 1 or 2 times per year. We'll ask you to come in more often if:  ? You start a new medicine or we change your medicine dose.  ? Your kidney function is getting worse.  ? Your blood pressure is not controlled.    At each visit, we will test your blood and urine and measure your blood pressure. (Remember to check your blood pressure at home to help guide your treatment.)    DON'T be afraid to ask questions. We are here to help you.  How to check your blood pressure at home:  1. Sit in a quiet area.  2. Remove thick clothing covering the arm.  3. Sit up straight with feet flat on the ground and legs  uncrossed.  4. Wrap the cuff around your upper arm above the elbow.  5. Rest the arm you are testing on a steady surface  6. Try to relax for 5 to 10 minutes before testing your blood pressure. This will make the reading more accurate.  7. Record your blood pressure, heart rate, day and time in a blood pressure log.  8. It is best to check your blood pressure around the same time every day--about an hour after taking your medicine.  NOTE: If at any point you are having trouble taking your blood pressure, please call the clinic. One of the nurses will be happy to meet with you. The nurse will review the steps and make sure your blood pressure cuff is working.  Things to avoid when checking blood pressure    Do not check your blood pressure right after waking up in the morning--wait at least an hour.    Do not check your blood pressure after exercise or heavy activity.    Avoid food, caffeine, alcohol and tobacco 30 minutes before testing.    Do not measure your blood pressure with a full bladder.    Avoid talking while checking your blood pressure  For more information  National Kidney Foundation   www.kidney.org  0-974-598-7105  Other resources  National Kidney Foundation   www.kidney.org  7-820-465-7342  For informational purposes only. Not to replace the advice of your health care provider.   Copyright   2016 [employer]. All rights reserved. [employer] 977834 - Rev 02/18.             Jacobo Bradford MD  [facility]

## 2019-08-29 NOTE — LETTER
September 5, 2019      Salty Beasley  [address]  [facility] [address]        Dear ,    We are writing to inform you of your test results.    Kidney function at around baseline low.  Other electrolytes came back normal.  Follow-up with psychiatry, neurologist and kidney specialist as discussed earlier    Resulted Orders   Basic metabolic panel   Result Value Ref Range    Sodium 139 133 - 144 mmol/L    Potassium 4.3 3.4 - 5.3 mmol/L    Chloride 106 94 - 109 mmol/L    Carbon Dioxide 28 20 - 32 mmol/L    Anion Gap 5 3 - 14 mmol/L    Glucose 108 (H) 70 - 99 mg/dL      Comment:      Non Fasting    Urea Nitrogen 41 (H) 7 - 30 mg/dL    Creatinine 1.86 (H) 0.66 - 1.25 mg/dL    GFR Estimate 41 (L) >60 mL/min/[1.73_m2]      Comment:      Non  GFR Calc  Starting 12/18/2018, serum creatinine based estimated GFR (eGFR) will be   calculated using the Chronic Kidney Disease Epidemiology Collaboration   (CKD-EPI) equation.      GFR Estimate If Black 48 (L) >60 mL/min/[1.73_m2]      Comment:       GFR Calc  Starting 12/18/2018, serum creatinine based estimated GFR (eGFR) will be   calculated using the Chronic Kidney Disease Epidemiology Collaboration   (CKD-EPI) equation.      Calcium 9.2 8.5 - 10.1 mg/dL       If you have any questions or concerns, please call the clinic at the number listed above.       Sincerely,        Jacobo Bradford MD/trang

## 2019-08-29 NOTE — PATIENT INSTRUCTIONS
"  Patient Education   High Blood Pressure and Chronic Kidney Disease (CKD)  What is high blood pressure?  For CKD patients, a normal blood pressure is 120/80 (or \"120 over 80\"). When blood pressure stays at 130/80 or higher, it is called high blood pressure (hypertension).  How are kidney disease and high blood pressure related?    More than half of the patients who have chronic kidney disease also have high blood pressure.    High blood pressure increases the chance that kidney disease will get worse.    High blood pressure is a major cause of CKD.   Damaged blood vessels send less blood to the important organs in your body, including your kidneys. Your kidneys filter waste from your blood. When your kidneys can't clean your blood as well as they should, this waste builds up in your body.    CKD can also cause high blood pressure. Your kidneys help keep your blood pressure in a healthy range. Controlling your blood pressure will keep your kidneys from getting worse. This will make CKD easier for you and your doctor to manage.  How do I treat high blood pressure and CKD?    Your doctor will give you blood pressure goals to meet and show you how to check your blood pressure at home.  ? It is important that you check your pressure as directed. High blood pressure often has no symptoms.  ? Make sure to write down the date, time and result of your readings.  ? If you take blood pressure medicine, take it before you measure blood pressure. This helps us know if your medicine is working the way it should.    Stop smoking.    Follow your diet and exercise plan.    Take all medicines as directed.  ? If you have kidney disease from diabetes or if you have protein in your urine, the best blood pressure medicines for your treatment are angiotensin converting enzyme (ACE) inhibitors or angiotensin receptor blockers (ARB).  ? If you have any side effects from your blood pressure medicine, tell your doctor. He or she may switch " you to a different medicine.  How often should I see my doctor?    Your CKD team will outline a treatment plan for you after you are diagnosed. Most patients come to the clinic 1 or 2 times per year. We'll ask you to come in more often if:  ? You start a new medicine or we change your medicine dose.  ? Your kidney function is getting worse.  ? Your blood pressure is not controlled.    At each visit, we will test your blood and urine and measure your blood pressure. (Remember to check your blood pressure at home to help guide your treatment.)    DON'T be afraid to ask questions. We are here to help you.  How to check your blood pressure at home:  1. Sit in a quiet area.  2. Remove thick clothing covering the arm.  3. Sit up straight with feet flat on the ground and legs uncrossed.  4. Wrap the cuff around your upper arm above the elbow.  5. Rest the arm you are testing on a steady surface  6. Try to relax for 5 to 10 minutes before testing your blood pressure. This will make the reading more accurate.  7. Record your blood pressure, heart rate, day and time in a blood pressure log.  8. It is best to check your blood pressure around the same time every day--about an hour after taking your medicine.  NOTE: If at any point you are having trouble taking your blood pressure, please call the clinic. One of the nurses will be happy to meet with you. The nurse will review the steps and make sure your blood pressure cuff is working.  Things to avoid when checking blood pressure    Do not check your blood pressure right after waking up in the morning--wait at least an hour.    Do not check your blood pressure after exercise or heavy activity.    Avoid food, caffeine, alcohol and tobacco 30 minutes before testing.    Do not measure your blood pressure with a full bladder.    Avoid talking while checking your blood pressure  For more information  National Kidney Foundation   www.kidney.org  3-687-779-4096  Other  resources  National Kidney Foundation   www.kidney.org  8-834-118-0484  For informational purposes only. Not to replace the advice of your health care provider.   Copyright   2016 [employer]. All rights reserved. [employer] 131207 - Rev 02/18.

## 2019-09-20 ENCOUNTER — TRANSFERRED RECORDS (OUTPATIENT)
Dept: HEALTH INFORMATION MANAGEMENT | Facility: CLINIC | Age: 49
End: 2019-09-20

## 2020-01-24 ENCOUNTER — TRANSFERRED RECORDS (OUTPATIENT)
Dept: HEALTH INFORMATION MANAGEMENT | Facility: CLINIC | Age: 50
End: 2020-01-24

## 2020-03-23 DIAGNOSIS — I10 POORLY-CONTROLLED HYPERTENSION: ICD-10-CM

## 2020-03-23 NOTE — TELEPHONE ENCOUNTER
"Requested Prescriptions   Pending Prescriptions Disp Refills     cloNIDine (CATAPRES) 0.1 MG tablet 28 tablet 1     Sig: Take 1 tablet (0.1 mg) by mouth twice daily.  Increase to 2 tablets (0.2 mg) twice daily if blood pressure not below 140/90.       Central Acting Antiadrenergic Agents Failed - 3/23/2020 10:03 AM        Failed - Blood pressure under 140/90 in past 12 months     BP Readings from Last 3 Encounters:   08/29/19 (!) 170/110   08/15/19 (!) 196/104   01/22/08 130/90                 Failed - Normal serum creatinine on file within past 12 months     Recent Labs   Lab Test 08/29/19  1457   CR 1.86*       Ok to refill medication if creatinine is low          Passed - Patient is 6 years of age or older        Passed - Recent (12 mo) or future (30 days) visit within the authorizing provider's specialty     Patient has had an office visit with the authorizing provider or a provider within the authorizing providers department within the previous 12 mos or has a future within next 30 days. See \"Patient Info\" tab in inbasket, or \"Choose Columns\" in Meds & Orders section of the refill encounter.      Last Written Prescription Date:  8/29/19  Last Fill Quantity: 28,  # refills: 1   Last office visit: 8/29/2019 with prescribing provider:     Future Office Visit:                Passed - Medication is active on med list       Antiadrenergic Antihypertensives Failed - 3/23/2020 10:03 AM        Failed - Blood pressure less than 140/90 in past 6 months     BP Readings from Last 3 Encounters:   08/29/19 (!) 170/110   08/15/19 (!) 196/104   01/22/08 130/90                 Failed - Normal serum creatinine on file in past 12 months     Recent Labs   Lab Test 08/29/19  1457   CR 1.86*       Ok to refill medication if creatinine is low          Failed - Recent (6 mo) or future (30 days) visit within the authorizing provider's specialty     Patient had office visit in the last 6 months or has a visit in the next 30 days with " "authorizing provider or within the authorizing provider's specialty.  See \"Patient Info\" tab in inbasket, or \"Choose Columns\" in Meds & Orders section of the refill encounter.            Passed - Medication is active on med list        Passed - Patient is age 18 or older           amLODIPine (NORVASC) 10 MG tablet 90 tablet 1     Sig: Take 1 tablet (10 mg) by mouth daily       Calcium Channel Blockers Protocol  Failed - 3/23/2020 10:03 AM        Failed - Blood pressure under 140/90 in past 12 months     BP Readings from Last 3 Encounters:   08/29/19 (!) 170/110   08/15/19 (!) 196/104   01/22/08 130/90                 Failed - Normal serum creatinine on file in past 12 months     Recent Labs   Lab Test 08/29/19  1457   CR 1.86*       Ok to refill medication if creatinine is low          Passed - Recent (12 mo) or future (30 days) visit within the authorizing provider's specialty     Patient has had an office visit with the authorizing provider or a provider within the authorizing providers department within the previous 12 mos or has a future within next 30 days. See \"Patient Info\" tab in in[employer]ie [employer], or \"Choose Columns\" in Meds & Orders section of the refill encounter.      Last Written Prescription Date:  8/15/19  Last Fill Quantity: 90,  # refills: 1   Last office visit: 8/29/2019 with prescribing provider:     Future Office Visit:                Passed - Medication is active on med list        Passed - Patient is age 18 or older             "

## 2020-03-24 RX ORDER — CLONIDINE HYDROCHLORIDE 0.1 MG/1
TABLET ORAL
Qty: 28 TABLET | Refills: 1 | Status: SHIPPED | OUTPATIENT
Start: 2020-03-24 | End: 2021-05-21

## 2020-03-24 RX ORDER — AMLODIPINE BESYLATE 10 MG/1
10 TABLET ORAL DAILY
Qty: 90 TABLET | Refills: 1 | Status: SHIPPED | OUTPATIENT
Start: 2020-03-24 | End: 2021-05-21

## 2021-01-19 ENCOUNTER — TELEPHONE (OUTPATIENT)
Dept: URGENT CARE | Facility: URGENT CARE | Age: 51
End: 2021-01-19

## 2021-01-19 DIAGNOSIS — Q61.3 PKD (POLYCYSTIC KIDNEY DISEASE): ICD-10-CM

## 2021-01-19 DIAGNOSIS — Z86.73 HISTORY OF MULTIPLE STROKES: ICD-10-CM

## 2021-01-19 DIAGNOSIS — F15.10 DRUG ABUSE, AMPHETAMINE TYPE (H): ICD-10-CM

## 2021-01-19 DIAGNOSIS — I10 POORLY-CONTROLLED HYPERTENSION: Primary | ICD-10-CM

## 2021-01-19 NOTE — TELEPHONE ENCOUNTER
History of a stroke, poorly controlled hypertension, drug abuse.  Will recommend to be seen in office for further evaluation/management.    Dr Bradford

## 2021-01-19 NOTE — TELEPHONE ENCOUNTER
"Spoke with pt to see what meds he needs: \"I don't know I just take a bunch of pills.\"  He does not have insurance.  Dr. Bradford is his PCP, reminded him he has not seen Dr. Bradford since 8/15/2019: \"Yes I know I don't have no money.\"    Please advise.    Pharmacy is [facility] in [place].    Clau TAM RN, BSN          "

## 2021-01-19 NOTE — TELEPHONE ENCOUNTER
"Message below relayed to pt: \"Okay then whatever.\"  He has way no way to get to the clinic.    Can we get Care Coordination involved?     Clau TMA RN, BSN        "

## 2021-01-19 NOTE — TELEPHONE ENCOUNTER
Reason for Call:  Other prescription    Detailed comments: patient states is out of stroke medication, needs refill order    Phone Number Patient can be reached at: Cell number on file:    Telephone Information:   Mobile [phone]       Best Time: anytime    Can we leave a detailed message on this number? YES    Call taken on 1/19/2021 at 3:34 PM by Marian Sanchez

## 2021-01-20 ENCOUNTER — PATIENT OUTREACH (OUTPATIENT)
Dept: CARE COORDINATION | Facility: CLINIC | Age: 51
End: 2021-01-20

## 2021-01-20 NOTE — LETTER
[place] CARE COORDINATION  [facility]  [address] [place]  [address]    March 1, 2021    Salty Beasley  [address]  [facility] [address]      Dear Salty,    I am a clinic community health worker who works at [facility]. I have been trying to reach you recently to introduce Clinic Care Coordination and to ask if there is anything our team could provide you with.  Below is a description of clinic care coordination and how we can further assist you.      The clinic care coordination team is made up of a registered nurse,  and community health worker who understand the health care system. The goal of clinic care coordination is to help you manage your health and improve access to the health care system in the most efficient manner. The team can assist you in meeting your health care goals by providing education, coordinating services, strengthening the communication among your providers and supporting you with any resource needs.    Please feel free to contact me with any questions or concerns. We are focused on providing you with the highest-quality healthcare experience possible and that all starts with you.     Sincerely,       Edyta HANSON, Community Health Worker  [facility] Care Coordination  [facility]  Phone: [phone]

## 2021-01-20 NOTE — PROGRESS NOTES
Clinic Care Coordination Contact  [place]/Voicemail     CHW Outreach attempted x 1.  Home number listed has been disconnected.  CHW could not leave a message on patient's voicemail for secondary number, mailbox not set up.   Plan: CHW will try to reach patient and/or emergency contacts (if any) in 1-2 business days.    Edyta HANSON Community Health Worker  [facility] Care Coordination  [facility]  Phone: [phone]

## 2021-01-21 NOTE — PROGRESS NOTES
Clinic Care Coordination Contact  [facility]/Voicemail     CHW Outreach attempted x 2.  Left message on patient's voicemail with call back information and requested return call.  Plan: CHW will try to reach patient again in 1-2 business days.    Edyta HANSON Community Health Worker  [facility] Care Coordination  [facility]  Phone: [phone]

## 2021-01-22 NOTE — PROGRESS NOTES
Clinic Care Coordination Contact  [facility]/Voicemail     CHW Outreach attempted x 3.  Left message on patient's contact's voicemail with call back information and requested return call.  Plan: CHW will sent care coordination introduction letter (3/1) with contact information and explanation of care coordination services via mail. CHW will do no further outreaches at this time.    Edyta HANSON Community Health Worker  [facility] Care Coordination  [facility]  Phone: [phone]

## 2021-05-21 ENCOUNTER — OFFICE VISIT (OUTPATIENT)
Dept: FAMILY MEDICINE | Facility: CLINIC | Age: 51
End: 2021-05-21
Payer: MEDICAID

## 2021-05-21 DIAGNOSIS — N18.32 STAGE 3B CHRONIC KIDNEY DISEASE (H): ICD-10-CM

## 2021-05-21 DIAGNOSIS — F32.A DEPRESSION, UNSPECIFIED DEPRESSION TYPE: ICD-10-CM

## 2021-05-21 DIAGNOSIS — Z86.73 HISTORY OF MULTIPLE STROKES: ICD-10-CM

## 2021-05-21 DIAGNOSIS — Z12.11 SPECIAL SCREENING FOR MALIGNANT NEOPLASMS, COLON: ICD-10-CM

## 2021-05-21 DIAGNOSIS — F15.10 DRUG ABUSE, AMPHETAMINE TYPE (H): ICD-10-CM

## 2021-05-21 DIAGNOSIS — Z13.6 CARDIOVASCULAR SCREENING; LDL GOAL LESS THAN 130: ICD-10-CM

## 2021-05-21 DIAGNOSIS — I10 POORLY-CONTROLLED HYPERTENSION: Primary | ICD-10-CM

## 2021-05-21 PROCEDURE — 99214 OFFICE O/P EST MOD 30 MIN: CPT | Performed by: NURSE PRACTITIONER

## 2021-05-21 RX ORDER — ESCITALOPRAM OXALATE 10 MG/1
TABLET ORAL
Qty: 55 TABLET | Refills: 1 | Status: SHIPPED | OUTPATIENT
Start: 2021-05-21 | End: 2022-02-02

## 2021-05-21 RX ORDER — CARVEDILOL 25 MG/1
25 TABLET ORAL 2 TIMES DAILY
Qty: 180 TABLET | Refills: 0 | Status: SHIPPED | OUTPATIENT
Start: 2021-05-21 | End: 2022-02-02

## 2021-05-21 RX ORDER — CLONIDINE HYDROCHLORIDE 0.1 MG/1
TABLET ORAL
Qty: 180 TABLET | Refills: 1 | Status: SHIPPED | OUTPATIENT
Start: 2021-05-21 | End: 2022-02-02

## 2021-05-21 RX ORDER — CLOPIDOGREL BISULFATE 75 MG/1
75 TABLET ORAL DAILY
Qty: 90 TABLET | Refills: 0 | Status: SHIPPED | OUTPATIENT
Start: 2021-05-21 | End: 2022-02-02

## 2021-05-21 RX ORDER — LISINOPRIL 40 MG/1
40 TABLET ORAL DAILY
Qty: 90 TABLET | Refills: 0 | Status: SHIPPED | OUTPATIENT
Start: 2021-05-21 | End: 2022-02-02

## 2021-05-21 RX ORDER — AMLODIPINE BESYLATE 10 MG/1
10 TABLET ORAL DAILY
Qty: 90 TABLET | Refills: 0 | Status: SHIPPED | OUTPATIENT
Start: 2021-05-21 | End: 2022-02-02

## 2021-05-21 RX ORDER — ATORVASTATIN CALCIUM 40 MG/1
40 TABLET, FILM COATED ORAL EVERY EVENING
Qty: 90 TABLET | Refills: 0 | Status: SHIPPED | OUTPATIENT
Start: 2021-05-21 | End: 2022-02-02

## 2021-05-21 ASSESSMENT — ANXIETY QUESTIONNAIRES
7. FEELING AFRAID AS IF SOMETHING AWFUL MIGHT HAPPEN: NEARLY EVERY DAY
GAD7 TOTAL SCORE: 15
3. WORRYING TOO MUCH ABOUT DIFFERENT THINGS: NEARLY EVERY DAY
1. FEELING NERVOUS, ANXIOUS, OR ON EDGE: NEARLY EVERY DAY
2. NOT BEING ABLE TO STOP OR CONTROL WORRYING: NEARLY EVERY DAY
5. BEING SO RESTLESS THAT IT IS HARD TO SIT STILL: NOT AT ALL
GAD7 TOTAL SCORE: 15
GAD7 TOTAL SCORE: 15
7. FEELING AFRAID AS IF SOMETHING AWFUL MIGHT HAPPEN: NEARLY EVERY DAY
6. BECOMING EASILY ANNOYED OR IRRITABLE: NEARLY EVERY DAY
4. TROUBLE RELAXING: NOT AT ALL

## 2021-05-21 ASSESSMENT — PATIENT HEALTH QUESTIONNAIRE - PHQ9
SUM OF ALL RESPONSES TO PHQ QUESTIONS 1-9: 24
10. IF YOU CHECKED OFF ANY PROBLEMS, HOW DIFFICULT HAVE THESE PROBLEMS MADE IT FOR YOU TO DO YOUR WORK, TAKE CARE OF THINGS AT HOME, OR GET ALONG WITH OTHER PEOPLE: VERY DIFFICULT
SUM OF ALL RESPONSES TO PHQ QUESTIONS 1-9: 24

## 2021-05-21 NOTE — PATIENT INSTRUCTIONS
Poorly-controlled hypertension  Chronic, has a history of multiple strokes in the past.  Has been off medications for several months.  Blood pressure significantly high in office today.  Advised patient to  refills of medications as soon as possible.  We will check a basic metabolic panel today as well and call patient with results.  Patient return to clinic in 2 weeks for nurse only blood pressure check.  Return to clinic in 3 months otherwise.  - amLODIPine (NORVASC) 10 MG tablet; Take 1 tablet (10 mg) by mouth daily  - carvedilol (COREG) 25 MG tablet; Take 1 tablet (25 mg) by mouth 2 times daily  - cloNIDine (CATAPRES) 0.1 MG tablet; Take 1 tablet (0.1 mg) by mouth twice daily.  Increase to 2 tablets (0.2 mg) twice daily if blood pressure not below 140/90.  - lisinopril (ZESTRIL) 40 MG tablet; Take 1 tablet (40 mg) by mouth daily  - Basic metabolic panel  (Ca, Cl, CO2, Creat, Gluc, K, Na, BUN)    Stage 3b chronic kidney disease  Chronic, last labs stable in 2019.  Will check basic metabolic panel today.  - Basic metabolic panel  (Ca, Cl, CO2, Creat, Gluc, K, Na, BUN)    History of multiple strokes  Chronic, history of multiple strokes.  Has not seen neurology since 2019.  Has been off medications for several months.  Refills placed, patient to start medications as soon as possible.  Schedule with neurology for follow-up as soon as possible as well.  Return to clinic in 3 months.  - atorvastatin (LIPITOR) 40 MG tablet; Take 1 tablet (40 mg) by mouth every evening  - NEUROLOGY ADULT REFERRAL  - clopidogrel (PLAVIX) 75 MG tablet; Take 1 tablet (75 mg) by mouth daily  Dispense: 90 tablet; Refill: 0    Depression, unspecified depression type  Chronic, worsened.  Has been off medication for several months.  Has a lot of stress and loss.  Recommend restarting Lexapro and increasing to 20 mg after 1 week.  Patient to follow-up in 3 months.  - escitalopram (LEXAPRO) 10 MG tablet; Take 1 tablet (10 mg) by mouth  daily for 7 days, THEN 2 tablets (20 mg) daily for 24 days.    Drug abuse, amphetamine type (H)  Intermittent, chronic use.  Uses 1-2 times weekly.  Highly recommended patient stop using.    Special screening for malignant neoplasms, colon  Screen.   - Fecal colorectal cancer screen (FIT); Future    CARDIOVASCULAR SCREENING; LDL GOAL LESS THAN 130  Screen.  - Lipid Profile (Chol, Trig, HDL, LDL calc)

## 2021-05-21 NOTE — PROGRESS NOTES
Assessment & Plan     Poorly-controlled hypertension  Chronic, has a history of multiple strokes in the past.  Has been off medications for several months.  Blood pressure significantly high in office today.  Advised patient to  refills of medications as soon as possible.  We will check a basic metabolic panel today as well and call patient with results.  Patient return to clinic in 2 weeks for nurse only blood pressure check.  Return to clinic in 3 months otherwise.  - amLODIPine (NORVASC) 10 MG tablet; Take 1 tablet (10 mg) by mouth daily  - carvedilol (COREG) 25 MG tablet; Take 1 tablet (25 mg) by mouth 2 times daily  - cloNIDine (CATAPRES) 0.1 MG tablet; Take 1 tablet (0.1 mg) by mouth twice daily.  Increase to 2 tablets (0.2 mg) twice daily if blood pressure not below 140/90.  - lisinopril (ZESTRIL) 40 MG tablet; Take 1 tablet (40 mg) by mouth daily  - Basic metabolic panel  (Ca, Cl, CO2, Creat, Gluc, K, Na, BUN)    Stage 3b chronic kidney disease  Chronic, last labs stable in 2019.  Will check basic metabolic panel today.  - Basic metabolic panel  (Ca, Cl, CO2, Creat, Gluc, K, Na, BUN)    History of multiple strokes  Chronic, history of multiple strokes.  Has not seen neurology since 2019.  Has been off medications for several months.  Refills placed, patient to start medications as soon as possible.  Schedule with neurology for follow-up as soon as possible as well.  Return to clinic in 3 months.  - atorvastatin (LIPITOR) 40 MG tablet; Take 1 tablet (40 mg) by mouth every evening  - NEUROLOGY ADULT REFERRAL  - clopidogrel (PLAVIX) 75 MG tablet; Take 1 tablet (75 mg) by mouth daily  Dispense: 90 tablet; Refill: 0    Depression, unspecified depression type  Chronic, worsened.  Has been off medication for several months.  Has a lot of stress and loss.  Recommend restarting Lexapro and increasing to 20 mg after 1 week.  Patient to follow-up in 3 months.  - escitalopram (LEXAPRO) 10 MG tablet; Take 1  tablet (10 mg) by mouth daily for 7 days, THEN 2 tablets (20 mg) daily for 24 days.    Drug abuse, amphetamine type (H)  Intermittent, chronic use.  Uses 1-2 times weekly.  Highly recommended patient stop using.    Special screening for malignant neoplasms, colon  Screen.   - Fecal colorectal cancer screen (FIT); Future    CARDIOVASCULAR SCREENING; LDL GOAL LESS THAN 130  Screen.  - Lipid Profile (Chol, Trig, HDL, LDL calc)      [facility] reviewed  10 minutes spent on the date of the encounter doing review of outside records        See Patient Instructions    No follow-ups on file.    JACINTA Graves [facility]    Lillian Pond is a 50 year old who presents for the following health issues     HPI     Hypertension Follow-up      Do you check your blood pressure regularly outside of the clinic? No     Are you following a low salt diet? Yes    Are your blood pressures ever more than 140 on the top number (systolic) OR more   than 90 on the bottom number (diastolic), for example 140/90? NA    Has been off medications for several months    Cerebrovascular Follow-up      Patient history: multiple cerebrovascular accident    Residual symptoms: Difficult walking, Difficulty speaking, Slurred speech, Weakness in the arm on the right, Weakness in the leg on the right and crying, depression    Worsened or new symptoms since last visit: No    Daily aspirin use: Yes    Hypertension controlled: No     Depression Followup    How are you doing with your depression since your last visit? Worsened out of medication    Are you having other symptoms that might be associated with depression? Yes:  crying    Have you had a significant life event?  Financial Concerns and Housing Concerns     Are you feeling anxious or having panic attacks?   Yes:  .    Do you have any concerns with your use of alcohol or other drugs?  Yes:  .using some meth occasional-gives him energy     Was on 10 mg of Lexapro  Distraught over losing everything and where he is in life       Social History     Tobacco Use     Smoking status: Never Smoker     Smokeless tobacco: Never Used   Substance Use Topics     Alcohol use: Not Currently     Comment: occ     Drug use: Never     PHQ 8/15/2019 5/21/2021   PHQ-9 Total Score 10 24   Q9: Thoughts of better off dead/self-harm past 2 weeks Not at all Not at all     ZABRINA-7 SCORE 5/21/2021   Total Score 15 (severe anxiety)   Total Score 15     Last PHQ-9 5/21/2021   1.  Little interest or pleasure in doing things 3   2.  Feeling down, depressed, or hopeless 3   3.  Trouble falling or staying asleep, or sleeping too much 3   4.  Feeling tired or having little energy 3   5.  Poor appetite or overeating 3   6.  Feeling bad about yourself 3   7.  Trouble concentrating 3   8.  Moving slowly or restless 3   Q9: Thoughts of better off dead/self-harm past 2 weeks 0   PHQ-9 Total Score 24   Difficulty at work, home, or with people -     ZABRINA-7  5/21/2021   1. Feeling nervous, anxious, or on edge 3   2. Not being able to stop or control worrying 3   3. Worrying too much about different things 3   4. Trouble relaxing 0   5. Being so restless that it is hard to sit still 0   6. Becoming easily annoyed or irritable 3   7. Feeling afraid, as if something awful might happen 3   ZABRINA-7 Total Score 15       Suicide Assessment Five-step Evaluation and Treatment (SAFE-T)      How many servings of fruits and vegetables do you eat daily?  0-1    On average, how many sweetened beverages do you drink each day (Examples: soda, juice, sweet tea, etc.  Do NOT count diet or artificially sweetened beverages)?   0    How many days per week do you exercise enough to make your heart beat faster? 3 or less    How many minutes a day do you exercise enough to make your heart beat faster? 9 or less as tolerated  How many days per week do you miss taking  your medication? 7    What makes it hard for you to take your medications?  didnt come into clinic      Review of Systems   Constitutional, HEENT, cardiovascular, pulmonary, gi and gu systems are negative, except as otherwise noted.      Objective    There were no vitals taken for this visit.  There is no height or weight on file to calculate BMI.  Physical Exam   GENERAL APPEARANCE: healthy, alert and crying  HENT: ear canals and TM's normal and nose and mouth without ulcers or lesions  NECK: no adenopathy, no asymmetry, masses, or scars, thyroid normal to palpation and no bruits  RESP: lungs clear to auscultation - no rales, rhonchi or wheezes  CV: regular rates and rhythm, normal S1 S2, no S3 or S4 and no murmur, click or rub  MS: extremities normal- no gross deformities noted, peripheral pulses normal and decreased strength on right side, uses cane   SKIN: no suspicious lesions or rashes  NEURO: Normal strength and tone, mentation intact and speech slightly slurred   PSYCH: mentation appears normal, anxious and crying    Diagnostic Test Results:  Pending

## 2021-05-22 ASSESSMENT — ANXIETY QUESTIONNAIRES: GAD7 TOTAL SCORE: 15

## 2021-05-25 NOTE — TELEPHONE ENCOUNTER
RECORDS RECEIVED FROM: Internal   Date of Appt: 5/27/21   NOTES (FOR ALL VISITS) STATUS DETAILS   OFFICE NOTE from referring provider Internal Abiola Win NP @ [facility]:  5/21/21   OFFICE NOTE from other specialist N/A    DISCHARGE SUMMARY from hospital Care Everywhere [facility]:  9/18/19-9/20/19  3/22/17-3/24/17  3/20/17-3/22/17   DISCHARGE REPORT from the ER Care Everywhere [facility]:  9/18/19  3/13/18   OPERATIVE REPORT N/A    MEDICATION LIST Internal    IMAGING  (FOR ALL VISITS)     EMG N/A    EEG N/A    LUMBAR PUNCTURE N/A    BRITTANY SCAN N/A    ULTRASOUND (CAROTID BILAT) *VASCULAR* N/A    MRI (HEAD, NECK, SPINE) In process [facility]:  MRA Head Neck Stroke 9/19/19    [facility]:  MRI Head 3/13/18   CT (HEAD, NECK, SPINE) In process [facility]:  CTA Head Neck Stroke 9/18/19  CT Head 9/18/19  CT Head 7/12/17  CT Head 6/20/17  CT Head 3/21/17    [facility]:  CT Head 3/13/18  MRA Head Neck 3/21/17  MRI Head 3/21/17      Action 5/25/21 MV 12.36pm   Action Taken Imaging request faxed to [facility] for:  MRA Head Neck Stroke 9/19/19  CTA Head Neck Stroke 9/18/19  CT Head 9/18/19  CT Head 7/12/17  CT Head 6/20/17  CT Head 3/21/17     Imaging request faxed to [facility] for:  MRI Head 3/13/18  CT Head 3/13/18  MRA Head Neck 3/21/17  MRI Head 3/21/17

## 2021-05-27 ENCOUNTER — VIRTUAL VISIT (OUTPATIENT)
Dept: NEUROLOGY | Facility: CLINIC | Age: 51
End: 2021-05-27
Attending: NURSE PRACTITIONER
Payer: MEDICAID

## 2021-05-27 ENCOUNTER — PRE VISIT (OUTPATIENT)
Dept: NEUROLOGY | Facility: CLINIC | Age: 51
End: 2021-05-27

## 2021-05-27 DIAGNOSIS — I69.351 HEMIPARESIS AFFECTING RIGHT SIDE AS LATE EFFECT OF STROKE (H): ICD-10-CM

## 2021-05-27 DIAGNOSIS — F19.10 SUBSTANCE ABUSE (H): ICD-10-CM

## 2021-05-27 DIAGNOSIS — I10 ESSENTIAL HYPERTENSION: ICD-10-CM

## 2021-05-27 DIAGNOSIS — F32.A DEPRESSION, UNSPECIFIED DEPRESSION TYPE: ICD-10-CM

## 2021-05-27 DIAGNOSIS — I63.9 CEREBROVASCULAR ACCIDENT (CVA), UNSPECIFIED MECHANISM (H): Primary | ICD-10-CM

## 2021-05-27 DIAGNOSIS — E78.5 DYSLIPIDEMIA: ICD-10-CM

## 2021-05-27 PROCEDURE — 99204 OFFICE O/P NEW MOD 45 MIN: CPT | Mod: 95 | Performed by: PSYCHIATRY & NEUROLOGY

## 2021-05-27 RX ORDER — ASPIRIN 325 MG
325 TABLET ORAL DAILY
Qty: 30 TABLET | Refills: 3 | Status: SHIPPED | OUTPATIENT
Start: 2021-05-27 | End: 2022-02-02 | Stop reason: ALTCHOICE

## 2021-05-27 NOTE — PROGRESS NOTES
Salty is a 50 year old who is being evaluated via a billable telephone visit.      What phone number would you like to be contacted at? [phone]  How would you like to obtain your AVS? Mail  Phone call duration: 25 minutes      45 minutes spent on the date of the encounter doing chart review, review of outside records, interpretation of tests, patient visit and documentation         Return in about 6 months (around 11/27/2021) for in person.    Britton Philippe MD  [facility] NEUROLOGY [facility]      Phone call duration: 25 minutes  _____________________________________________________________    [facility]    at [facility]  _____________________________________________________________      Chief Complaint: No chief complaint on file.        History of Present Illness: Salty Beasley is a 50 year old R handed male presenting as a new patient for stroke. He has past medical hx of  hypertension, anxiety and depression, and no routine medical care (prior to 2017), medication non-compliance, polycystic kidney disease, s/p renal transplant, chronic kidney disease stage III, methamphetamine use and stroke (etiology small vessel disease).    Patient is doing fair. He appears to be depress and in shock with recent changes in his life and that is not limited to stroke itself. He has lost his job and insurance and was not taking his medications. He reports it is difficulty for him to use his R arm , it is difficult for him to do screwing movements with his R arm. His R leg gets stiff when he is walking. He reports his speech is getting better. He is taking medications as prescribed but can attest to taking aspirin plavix and statin. He was unable to recall names of BP meds but tells me that he is taking 8 medication which appears to be right based on EMR documentation.    He expressed his concern and frustrations about his  stroke. He feels low in energy level but he is trying to be better. He denies any plan of feeling of hurting himself. 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 He is very distraught about it. He reports to me using marijuana, mushroom and methamphetamine (last use 2-3 weeks ago). He reports that it makes him feel better.    Salty Beasley is a 49 y.o. male with a past medical history significant for hypertension, anxiety and depression, and no routine medical care (prior to 2017), medication non-compliance, polycystic kidney disease, s/p renal transplant, chronic kidney disease stage III, methamphetamine use (urine + 3/14/18 & 6/20/17), and two other hospitalization at [facility] for stroke. Mr. Beasley was hospitalized at [facility] from 3/22 to 3/24/2017 with acute right pontine infarction.  MRI revealed evidence of multiple strokes some subacute some chronic.  Etiology was thought to be small vessel disease.  3/21 He did have an 8 beat run of wide complex tachycardia (possible atrial fibrillation), cardiology was consulted and recommended cardiac CTA vs stress echo to evaluate coronary perfusion. We planned dual antiplatelet therapy for 90 days then monotherapy with Aspirin and continue statin.  Mr. Beasley was a no show for scheduled follow up in neurovascular clinic on 4/27/2017.  Attempts to contact him for follow at up 30 & 90 days were unsuccessful despite multiple phone calls and a letter mailed to home address after he no showed for his scheduled appointment. Salty Beasley was admitted to [facility] from 3/13 to 3/21/2018 with subcortical left hemisphere infarction.  He admitted to be off of his medication for some time.  MRI revealed new focus of restricted diffusion in the left corona radiata.  He was transferred to [facility] for continuing care.  Etiology was thought to be small vessel disease  in the setting of uncontrolled hypertension and medical non-compliance. We planned dual antiplatelet therapy for 90 days then monotherapy with Aspirin. His hospital course was prolonged due to agitation and threats to leave AMA.  He was placed on a 72 hour hold and psychiatry was involved. Multiple attempts were made to follow up in the neurovascular clinic. He declined follow up with me in the neurovascular clinic and no showed for follow up at [facility]. 30 day and 90 day follow up was accomplished via phone update from his sister Chandrika. At the time of 90 day follow up he continued to lived at home with the support of his family. At that time he was not driving or working. Family was coordinating appointments for him and assuring he took his medication. His sister described residual balance problems, memory and language problems, and some degree of right hemiparesis with and coordination problems. Modified Liberty Score was 2.     9/18/2019: Salty Beasley was admitted to [facility] from 9/18 to 9/20/2019 with left thalamic infarction. Mr. Beasley presented to [facility] on 9/18/2019 after waking with right facial droop and right hemiparesis. A stroke code was called. NIHSS was 5 (1 facial palsy, 1 right motor, 1 right leg, 1 sensory, 1 dysarthria). He was well out of the window for IV t-Pa. CT revealed chronic infarction in the left basal ganglia and right frontal subcortical infarction. CTA of the head and neck revealed mild <50% stenosis at the origin of the left internal carotid artery with > 2 mm residual lumen. He was transferred to [facility] [place] for continuing care. Subsequent MRI confirmed small recent infarction in the left thalamus and multiple chronic infarction in the basal ganglia, thalami, upper brainstem, and cerebellum. He suffered an acute left thalamic infarction. Etiology was thought to be small vessel disease. U tox was positive for THC and methamphetamine (as it was during the last two admissions  "for stroke). 9/20 Discharged home. Estimated MRS at discharge 3. Secondary stroke prevention: continued Aspirin, Plavix, and Atorvastatin 40 mg (unchanged, LDL 99), hgb A1c 6.0.        Impression:   Problem List Items Addressed This Visit     None        50 year old male with past medical hx as detailed above is seen in clinic for his hx of stroke. Based on detailed evaluation as summarized above it appears that his likely etiology for stroke in small vessel disease. There is evidence of hypertensive disease on his MRI based on the reports of sub cortical strokes and microhemmorhage. This situation is certainly not getting any benefit from his use of methampheatamine ( this use can be related to his social and health stressors recently).     Plan:   - Continue to work with PCP for better blood pressure control. Long term goal will be to have BP <140/90. This can be achieved slowly over next few months.   - Continue statin for dyslipidemia.  - I have asked him to stop aspirin 81 mg and plavix 75 mg. There is no good evidence to suggest use of dual therapy in stroke long term.  - Start full dose 325 mg aspirin daily lifelong.   - PT/ OT referral made  - Referral for mental health for depression/ anxiety and substance abuse.     - Follow up in  6 months    Stroke Education provided.  He will call us with any questions.  For any acute neurologic deficits he was advised to  go directly to the hospital rather than call the clinic.    Britton Philippe MD  Neurology  05/27/2021 12:25 PM  To page me or covering stroke neurology team member, click here: AMCOM  Choose \"On Call\" tab at top, then search dropdown box for \"Neurology Adult\" & press Enter, look for Neuro ICU/Stroke    ___________________________________________________________________    Current Medications  Current Outpatient Medications   Medication Sig     amLODIPine (NORVASC) 10 MG tablet Take 1 tablet (10 mg) by mouth daily     aspirin 81 MG EC tablet Take 1 " tablet (81 mg) by mouth daily     atorvastatin (LIPITOR) 40 MG tablet Take 1 tablet (40 mg) by mouth every evening     carvedilol (COREG) 25 MG tablet Take 1 tablet (25 mg) by mouth 2 times daily     cloNIDine (CATAPRES) 0.1 MG tablet Take 1 tablet (0.1 mg) by mouth twice daily.  Increase to 2 tablets (0.2 mg) twice daily if blood pressure not below 140/90.     clopidogrel (PLAVIX) 75 MG tablet Take 1 tablet (75 mg) by mouth daily     escitalopram (LEXAPRO) 10 MG tablet Take 1 tablet (10 mg) by mouth daily for 7 days, THEN 2 tablets (20 mg) daily for 24 days.     lisinopril (ZESTRIL) 40 MG tablet Take 1 tablet (40 mg) by mouth daily     No current facility-administered medications for this visit.        Past Medical History  Past Medical History:   Diagnosis Date     History of methamphetamine abuse      History of multiple strokes      Hypertension      NO ACTIVE PROBLEMS        Social History  Social History     Tobacco Use     Smoking status: Never Smoker     Smokeless tobacco: Never Used   Substance Use Topics     Alcohol use: Not Currently     Comment: occ     Drug use: Never       Family History  Family History   Problem Relation Age of Onset     Genitourinary Problems Mother         polycystic kidney     Hypertension Mother      Diabetes Mother      Heart Disease Mother         CHF     Cancer Father         bladder cancer     Heart Disease Maternal Grandmother      Cerebrovascular Disease Maternal Grandfather      Breast Cancer Paternal Grandmother      Hypertension Paternal Grandmother      Diabetes Paternal Grandmother        ROS: 10 point relevant ROS neg other than the symptoms noted above in the HPI.    Physical Exam    Neurologic  Speech appears clear with mild dysarthria. Rest of the exam limited by telephone encounter.     Labs:    Recent Labs   Lab Test 04/06/19   INR 1.1        Recent Labs   Lab Test 08/15/19  1510 03/14/18   CHOL 157 133   HDL 54 37*   LDL 83 79   TRIG 102 86       Recent Labs    Lab Test 03/13/18   A1C 5.5       No lab results found.

## 2021-05-27 NOTE — PROGRESS NOTES
Salty is a 50 year old who is being evaluated via a billable telephone visit.      What phone number would you like to be contacted at? [phone]  How would you like to obtain your AVS? Mail  Phone call duration: 25 minutes

## 2021-05-27 NOTE — PATIENT INSTRUCTIONS
Stop aspirin 81 mg and plavix 75 mg   Start Aspirin 325 mg daily ( prescription sent to pharmacy)  Referral made to physical therapy and occupational therapy  Referral made for mental health services as well.

## 2021-05-27 NOTE — LETTER
5/27/2021       RE: Salty Beasley  [address]  [facility] [address]     Dear Colleague,    Thank you for referring your patient, Salty Beasley, to the [facility] [place] at . Please see a copy of my visit note below.    Salty is a 50 year old who is being evaluated via a billable telephone visit.      What phone number would you like to be contacted at? [phone]  How would you like to obtain your AVS? Mail  Phone call duration: 25 minutes    45 minutes spent on the date of the encounter doing chart review, review of outside records, interpretation of tests, patient visit and documentation       Return in about 6 months (around 11/27/2021) for in person.    Britton Philippe MD  [facility] [place]      Phone call duration: 25 minutes  _____________________________________________________________    [facility]    at [facility]  _____________________________________________________________      Chief Complaint: No chief complaint on file.        History of Present Illness: Salty Beasley is a 50 year old R handed male presenting as a new patient for stroke. He has past medical hx of  hypertension, anxiety and depression, and no routine medical care (prior to 2017), medication non-compliance, polycystic kidney disease, s/p renal transplant, chronic kidney disease stage III, methamphetamine use and stroke (etiology small vessel disease).    Patient is doing fair. He appears to be depress and in shock with recent changes in his life and that is not limited to stroke itself. He has lost his job and insurance and was not taking his medications. He reports it is difficulty for him to use his R arm , it is difficult for him to do screwing movements with his R arm. His R leg gets stiff when he is walking. He reports his speech is  getting better. He is taking medications as prescribed but can attest to taking aspirin plavix and statin. He was unable to recall names of BP meds but tells me that he is taking 8 medication which appears to be right based on EMR documentation.    He expressed his concern and frustrations about his stroke. He feels low in energy level but he is trying to be better. He denies any plan of feeling of hurting himself. 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 He is very distraught about it. He reports to me using marijuana, mushroom and methamphetamine (last use 2-3 weeks ago). He reports that it makes him feel better.    Salty Beasley is a 49 y.o. male with a past medical history significant for hypertension, anxiety and depression, and no routine medical care (prior to 2017), medication non-compliance, polycystic kidney disease, s/p renal transplant, chronic kidney disease stage III, methamphetamine use (urine + 3/14/18 & 6/20/17), and two other hospitalization at [facility] for stroke. Mr. Beasley was hospitalized at [facility] from 3/22 to 3/24/2017 with acute right pontine infarction.  MRI revealed evidence of multiple strokes some subacute some chronic.  Etiology was thought to be small vessel disease.  3/21 He did have an 8 beat run of wide complex tachycardia (possible atrial fibrillation), cardiology was consulted and recommended cardiac CTA vs stress echo to evaluate coronary perfusion. We planned dual antiplatelet therapy for 90 days then monotherapy with Aspirin and continue statin.  Mr. Beasley was a no show for scheduled follow up in neurovascular clinic on 4/27/2017.  Attempts to contact him for follow at up 30 & 90 days were unsuccessful despite multiple phone calls and a letter mailed to home address after he no showed for his scheduled appointment. Salty Beasley was admitted to  [facility] from 3/13 to 3/21/2018 with subcortical left hemisphere infarction.  He admitted to be off of his medication for some time.  MRI revealed new focus of restricted diffusion in the left corona radiata.  He was transferred to [facility] for continuing care.  Etiology was thought to be small vessel disease in the setting of uncontrolled hypertension and medical non-compliance. We planned dual antiplatelet therapy for 90 days then monotherapy with Aspirin. His hospital course was prolonged due to agitation and threats to leave AMA.  He was placed on a 72 hour hold and psychiatry was involved. Multiple attempts were made to follow up in the neurovascular clinic. He declined follow up with me in the neurovascular clinic and no showed for follow up at [facility]. 30 day and 90 day follow up was accomplished via phone update from his sister Chandrika. At the time of 90 day follow up he continued to lived at home with the support of his family. At that time he was not driving or working. Family was coordinating appointments for him and assuring he took his medication. His sister described residual balance problems, memory and language problems, and some degree of right hemiparesis with and coordination problems. Modified Ismael Score was 2.     9/18/2019: Salty Beasley was admitted to [facility] hospital from 9/18 to 9/20/2019 with left thalamic infarction. Mr. Beasley presented to [place] ER on 9/18/2019 after waking with right facial droop and right hemiparesis. A stroke code was called. NIHSS was 5 (1 facial palsy, 1 right motor, 1 right leg, 1 sensory, 1 dysarthria). He was well out of the window for IV t-Pa. CT revealed chronic infarction in the left basal ganglia and right frontal subcortical infarction. CTA of the head and neck revealed mild <50% stenosis at the origin of the left internal carotid artery with > 2 mm residual lumen. He was transferred to [facility] for continuing care. Subsequent MRI confirmed small recent infarction  "in the left thalamus and multiple chronic infarction in the basal ganglia, thalami, upper brainstem, and cerebellum. He suffered an acute left thalamic infarction. Etiology was thought to be small vessel disease. U tox was positive for THC and methamphetamine (as it was during the last two admissions for stroke). 9/20 Discharged home. Estimated MRS at discharge 3. Secondary stroke prevention: continued Aspirin, Plavix, and Atorvastatin 40 mg (unchanged, LDL 99), hgb A1c 6.0.        Impression:   Problem List Items Addressed This Visit     None        50 year old male with past medical hx as detailed above is seen in clinic for his hx of stroke. Based on detailed evaluation as summarized above it appears that his likely etiology for stroke in small vessel disease. There is evidence of hypertensive disease on his MRI based on the reports of sub cortical strokes and microhemmorhage. This situation is certainly not getting any benefit from his use of methampheatamine ( this use can be related to his social and health stressors recently).     Plan:   - Continue to work with PCP for better blood pressure control. Long term goal will be to have BP <140/90. This can be achieved slowly over next few months.   - Continue statin for dyslipidemia.  - I have asked him to stop aspirin 81 mg and plavix 75 mg. There is no good evidence to suggest use of dual therapy in stroke long term.  - Start full dose 325 mg aspirin daily lifelong.   - PT/ OT referral made  - Referral for mental health for depression/ anxiety and substance abuse.     - Follow up in  6 months    Stroke Education provided.  He will call us with any questions.  For any acute neurologic deficits he was advised to  go directly to the hospital rather than call the clinic.    Britton Philippe MD  Neurology  05/27/2021 12:25 PM  To page me or covering stroke neurology team member, click here: AMCOM  Choose \"On Call\" tab at top, then search dropdown box for \"Neurology " "Adult\" & press Enter, look for Neuro ICU/Stroke    ___________________________________________________________________    Current Medications  Current Outpatient Medications   Medication Sig     amLODIPine (NORVASC) 10 MG tablet Take 1 tablet (10 mg) by mouth daily     aspirin 81 MG EC tablet Take 1 tablet (81 mg) by mouth daily     atorvastatin (LIPITOR) 40 MG tablet Take 1 tablet (40 mg) by mouth every evening     carvedilol (COREG) 25 MG tablet Take 1 tablet (25 mg) by mouth 2 times daily     cloNIDine (CATAPRES) 0.1 MG tablet Take 1 tablet (0.1 mg) by mouth twice daily.  Increase to 2 tablets (0.2 mg) twice daily if blood pressure not below 140/90.     clopidogrel (PLAVIX) 75 MG tablet Take 1 tablet (75 mg) by mouth daily     escitalopram (LEXAPRO) 10 MG tablet Take 1 tablet (10 mg) by mouth daily for 7 days, THEN 2 tablets (20 mg) daily for 24 days.     lisinopril (ZESTRIL) 40 MG tablet Take 1 tablet (40 mg) by mouth daily     No current facility-administered medications for this visit.        Past Medical History  Past Medical History:   Diagnosis Date     History of methamphetamine abuse      History of multiple strokes      Hypertension      NO ACTIVE PROBLEMS        Social History  Social History     Tobacco Use     Smoking status: Never Smoker     Smokeless tobacco: Never Used   Substance Use Topics     Alcohol use: Not Currently     Comment: occ     Drug use: Never       Family History  Family History   Problem Relation Age of Onset     Genitourinary Problems Mother         polycystic kidney     Hypertension Mother      Diabetes Mother      Heart Disease Mother         CHF     Cancer Father         bladder cancer     Heart Disease Maternal Grandmother      Cerebrovascular Disease Maternal Grandfather      Breast Cancer Paternal Grandmother      Hypertension Paternal Grandmother      Diabetes Paternal Grandmother        ROS: 10 point relevant ROS neg other than the symptoms noted above in the " HPI.    Physical Exam    Neurologic  Speech appears clear with mild dysarthria. Rest of the exam limited by telephone encounter.     Labs:    Recent Labs   Lab Test 04/06/19   INR 1.1        Recent Labs   Lab Test 08/15/19  1510 03/14/18   CHOL 157 133   HDL 54 37*   LDL 83 79   TRIG 102 86       Recent Labs   Lab Test 03/13/18   A1C 5.5       No lab results found.    Again, thank you for allowing me to participate in the care of your patient.      Sincerely,    Britton Philippe MD

## 2021-06-16 ENCOUNTER — TELEPHONE (OUTPATIENT)
Dept: FAMILY MEDICINE | Facility: CLINIC | Age: 51
End: 2021-06-16

## 2021-06-18 ENCOUNTER — VIRTUAL VISIT (OUTPATIENT)
Dept: FAMILY MEDICINE | Facility: CLINIC | Age: 51
End: 2021-06-18
Payer: MEDICAID

## 2021-06-18 DIAGNOSIS — Z53.9 NO SHOW: Primary | ICD-10-CM

## 2021-06-18 NOTE — PROGRESS NOTES
Attempted to reach patient to set up vv, mailbox is full-unable to leave a message. Letter mailed to patient to schedule a lab visit, BP nurse visit and telephone visit with Abiola Win CNP  This patient was a no show for this scheduled appointment.

## 2021-06-18 NOTE — LETTER
[facility]  [address] [place] [address]  Phone: [phone]  Fax: [phone]        June 18, 2021      Salty Beasley                                                                                                                                [facility]  [facility] [address]            Dear Mr. Beasley,    We are concerned about your health care.      At this time we ask that: You schedule a nurse blood pressure check, lab visit -these can be scheduled in [place] or [place].   Please schedule a telephone visit with Abiola.     Call the number above to schedule.          Thank you,      Abiola WORKMAN-NUNU/ trang

## 2021-07-07 ENCOUNTER — TELEPHONE (OUTPATIENT)
Dept: FAMILY MEDICINE | Facility: CLINIC | Age: 51
End: 2021-07-07

## 2021-07-07 NOTE — LETTER
[facility]  [address] [place] [address], [place] [address]  [phone]        Salty Beasley                                                               Date: 7/7/2021  [facility] [address]  [facility] [address]      Dear Salty,    In order to ensure we are providing the best quality care, we have reviewed your chart and see that you are due for:  1.   An appointment for your blood pressure      Please call the clinic at your earliest convenience to schedule an appointment.    We greatly appreciate the opportunity to serve you. Thank you for trusting us with your health care.      Sincerely,    Your care team at [facility]    Abiola WORKMAN-NUNU/trang

## 2021-07-07 NOTE — TELEPHONE ENCOUNTER
Patient Quality Outreach 2nd Attempt      Summary:    Type of outreach:    Sent letter.    Next Steps:  Reach out within 90 days via Letter.    Max number of attempts reached: No. Will try again in 90 days if patient still on fail list.    Questions for provider review:    None                                                                                   Patient no showed his virtual appointment with Silvano, Does not check his BP at home and quit taking all his medication. (as of  6/16/2021)                                  Marylou PARTIDA CMA       Chart routed to Care Team.

## 2021-08-11 ENCOUNTER — TELEPHONE (OUTPATIENT)
Dept: FAMILY MEDICINE | Facility: CLINIC | Age: 51
End: 2021-08-11

## 2021-08-11 NOTE — LETTER
[facility]  [address] [place] [address]  [phone]        Salty Beasley                                                               Date: 8/11/2021  [address]  [facility] [address]      Dear Salty,    On review of your electronic health record, your last blood pressure check was elevated.  BP Readings from Last 3 Encounters:   08/29/19 (!) 170/110   08/15/19 (!) 196/104   01/22/08 130/90     Please schedule a free nurse only appointment at your nearest [place] to have your blood pressure checked. You may also have your Blood Pressure checked at a [place] pharmacy.    We greatly appreciate the opportunity to serve you. Thank you for trusting us with your health care.      Sincerely,    Your care team at [facility]/trang

## 2021-08-11 NOTE — TELEPHONE ENCOUNTER
Patient Quality Outreach 2nd Attempt      Summary:    Type of outreach:    Sent letter.    Next Steps:  Reach out within 90 days via Letter.    Max number of attempts reached: Yes. Will try again in 90 days if patient still on fail list.    Questions for provider review:    None                                                                                                                    Marylou PARTIDA [facility]       Chart routed to Care Team.

## 2022-01-27 ENCOUNTER — TELEPHONE (OUTPATIENT)
Dept: FAMILY MEDICINE | Facility: CLINIC | Age: 52
End: 2022-01-27
Payer: COMMERCIAL

## 2022-01-27 NOTE — TELEPHONE ENCOUNTER
"Salty grewal , says he needs refills of \"all his meds\". He has no showed last 2 appointments and lab appointment. Advised he needs to schedule an appointment and needs to request refills from his pharmacy. Transferred to appointment desk  "

## 2022-02-02 ENCOUNTER — TELEPHONE (OUTPATIENT)
Dept: FAMILY MEDICINE | Facility: CLINIC | Age: 52
End: 2022-02-02

## 2022-02-02 ENCOUNTER — OFFICE VISIT (OUTPATIENT)
Dept: FAMILY MEDICINE | Facility: CLINIC | Age: 52
End: 2022-02-02
Payer: COMMERCIAL

## 2022-02-02 VITALS
RESPIRATION RATE: 24 BRPM | HEIGHT: 70 IN | TEMPERATURE: 98.2 F | OXYGEN SATURATION: 98 % | DIASTOLIC BLOOD PRESSURE: 108 MMHG | SYSTOLIC BLOOD PRESSURE: 194 MMHG | BODY MASS INDEX: 27.63 KG/M2 | HEART RATE: 81 BPM | WEIGHT: 193 LBS

## 2022-02-02 DIAGNOSIS — N18.32 STAGE 3B CHRONIC KIDNEY DISEASE (H): ICD-10-CM

## 2022-02-02 DIAGNOSIS — Z86.73 HISTORY OF MULTIPLE STROKES: ICD-10-CM

## 2022-02-02 DIAGNOSIS — F32.A DEPRESSION, UNSPECIFIED DEPRESSION TYPE: ICD-10-CM

## 2022-02-02 DIAGNOSIS — I10 POORLY-CONTROLLED HYPERTENSION: Primary | ICD-10-CM

## 2022-02-02 LAB
ANION GAP SERPL CALCULATED.3IONS-SCNC: 6 MMOL/L (ref 3–14)
BUN SERPL-MCNC: 45 MG/DL (ref 7–30)
CALCIUM SERPL-MCNC: 9.2 MG/DL (ref 8.5–10.1)
CHLORIDE BLD-SCNC: 105 MMOL/L (ref 94–109)
CHOLEST SERPL-MCNC: 194 MG/DL
CO2 SERPL-SCNC: 25 MMOL/L (ref 20–32)
CREAT SERPL-MCNC: 3.02 MG/DL (ref 0.66–1.25)
FASTING STATUS PATIENT QL REPORTED: NO
GFR SERPL CREATININE-BSD FRML MDRD: 24 ML/MIN/1.73M2
GLUCOSE BLD-MCNC: 95 MG/DL (ref 70–99)
HDLC SERPL-MCNC: 47 MG/DL
HGB BLD-MCNC: 17.6 G/DL (ref 13.3–17.7)
LDLC SERPL CALC-MCNC: 121 MG/DL
NONHDLC SERPL-MCNC: 147 MG/DL
POTASSIUM BLD-SCNC: 4.4 MMOL/L (ref 3.4–5.3)
SODIUM SERPL-SCNC: 136 MMOL/L (ref 133–144)
TRIGL SERPL-MCNC: 128 MG/DL

## 2022-02-02 PROCEDURE — 85018 HEMOGLOBIN: CPT | Performed by: NURSE PRACTITIONER

## 2022-02-02 PROCEDURE — 99214 OFFICE O/P EST MOD 30 MIN: CPT | Performed by: NURSE PRACTITIONER

## 2022-02-02 PROCEDURE — 80048 BASIC METABOLIC PNL TOTAL CA: CPT | Performed by: NURSE PRACTITIONER

## 2022-02-02 PROCEDURE — 80061 LIPID PANEL: CPT | Performed by: NURSE PRACTITIONER

## 2022-02-02 PROCEDURE — 36415 COLL VENOUS BLD VENIPUNCTURE: CPT | Performed by: NURSE PRACTITIONER

## 2022-02-02 RX ORDER — ESCITALOPRAM OXALATE 10 MG/1
TABLET ORAL
Qty: 55 TABLET | Refills: 3 | Status: SHIPPED | OUTPATIENT
Start: 2022-02-02 | End: 2022-11-01

## 2022-02-02 RX ORDER — LISINOPRIL 40 MG/1
40 TABLET ORAL DAILY
Qty: 90 TABLET | Refills: 3 | Status: SHIPPED | OUTPATIENT
Start: 2022-02-02 | End: 2022-08-28

## 2022-02-02 RX ORDER — CLOPIDOGREL BISULFATE 75 MG/1
75 TABLET ORAL DAILY
Qty: 90 TABLET | Refills: 3 | Status: SHIPPED | OUTPATIENT
Start: 2022-02-02 | End: 2023-01-01

## 2022-02-02 RX ORDER — ATORVASTATIN CALCIUM 40 MG/1
40 TABLET, FILM COATED ORAL EVERY EVENING
Qty: 90 TABLET | Refills: 3 | Status: SHIPPED | OUTPATIENT
Start: 2022-02-02 | End: 2023-01-01

## 2022-02-02 RX ORDER — CARVEDILOL 25 MG/1
25 TABLET ORAL 2 TIMES DAILY
Qty: 180 TABLET | Refills: 3 | Status: SHIPPED | OUTPATIENT
Start: 2022-02-02 | End: 2022-04-06 | Stop reason: DRUGHIGH

## 2022-02-02 RX ORDER — ASPIRIN 81 MG/1
81 TABLET ORAL DAILY
Qty: 90 TABLET | Refills: 3 | Status: SHIPPED | OUTPATIENT
Start: 2022-02-02

## 2022-02-02 RX ORDER — CLONIDINE HYDROCHLORIDE 0.1 MG/1
TABLET ORAL
Qty: 180 TABLET | Refills: 3 | Status: SHIPPED | OUTPATIENT
Start: 2022-02-02 | End: 2022-08-28

## 2022-02-02 RX ORDER — AMLODIPINE BESYLATE 10 MG/1
10 TABLET ORAL DAILY
Qty: 90 TABLET | Refills: 3 | Status: SHIPPED | OUTPATIENT
Start: 2022-02-02 | End: 2022-08-26

## 2022-02-02 ASSESSMENT — MIFFLIN-ST. JEOR: SCORE: 1731.69

## 2022-02-02 ASSESSMENT — ANXIETY QUESTIONNAIRES
1. FEELING NERVOUS, ANXIOUS, OR ON EDGE: NOT AT ALL
6. BECOMING EASILY ANNOYED OR IRRITABLE: NOT AT ALL
5. BEING SO RESTLESS THAT IT IS HARD TO SIT STILL: NOT AT ALL
7. FEELING AFRAID AS IF SOMETHING AWFUL MIGHT HAPPEN: SEVERAL DAYS
3. WORRYING TOO MUCH ABOUT DIFFERENT THINGS: NOT AT ALL
2. NOT BEING ABLE TO STOP OR CONTROL WORRYING: NOT AT ALL
GAD7 TOTAL SCORE: 1
4. TROUBLE RELAXING: NOT AT ALL
GAD7 TOTAL SCORE: 1
7. FEELING AFRAID AS IF SOMETHING AWFUL MIGHT HAPPEN: SEVERAL DAYS
GAD7 TOTAL SCORE: 1

## 2022-02-02 ASSESSMENT — PAIN SCALES - GENERAL: PAINLEVEL: MODERATE PAIN (5)

## 2022-02-02 ASSESSMENT — PATIENT HEALTH QUESTIONNAIRE - PHQ9
SUM OF ALL RESPONSES TO PHQ QUESTIONS 1-9: 24
SUM OF ALL RESPONSES TO PHQ QUESTIONS 1-9: 24

## 2022-02-02 NOTE — LETTER
February 4, 2022      Salty Beasley  [address]  [facility] [address]        Dear ,    We are writing to inform you of your test results.    Notify kidney functions are worsened, likely due to not taking medication and elevated blood pressure for long periods of time. Check in and make sure he has started his medications and remind him of his 2-week nurse only blood pressure check visit. His cholesterol levels are slightly worse, the LDL (bad cholesterol) is slightly above normal, would recommend following a healthy, low-fat diet. His hemoglobin was also within normal limits.      Resulted Orders   Lipid panel reflex to direct LDL Non-fasting   Result Value Ref Range    Cholesterol 194 <200 mg/dL    Triglycerides 128 <150 mg/dL    Direct Measure HDL 47 >=40 mg/dL    LDL Cholesterol Calculated 121 (H) <=100 mg/dL    Non HDL Cholesterol 147 (H) <130 mg/dL    Patient Fasting > 8hrs? No     Narrative    Cholesterol  Desirable:  <200 mg/dL    Triglycerides  Normal:  Less than 150 mg/dL  Borderline High:  150-199 mg/dL  High:  200-499 mg/dL  Very High:  Greater than or equal to 500 mg/dL    Direct Measure HDL  Female:  Greater than or equal to 50 mg/dL   Male:  Greater than or equal to 40 mg/dL    LDL Cholesterol  Desirable:  <100mg/dL  Above Desirable:  100-129 mg/dL   Borderline High:  130-159 mg/dL   High:  160-189 mg/dL   Very High:  >= 190 mg/dL    Non HDL Cholesterol  Desirable:  130 mg/dL  Above Desirable:  130-159 mg/dL  Borderline High:  160-189 mg/dL  High:  190-219 mg/dL  Very High:  Greater than or equal to 220 mg/dL   Basic metabolic panel  (Ca, Cl, CO2, Creat, Gluc, K, Na, BUN)   Result Value Ref Range    Sodium 136 133 - 144 mmol/L    Potassium 4.4 3.4 - 5.3 mmol/L    Chloride 105 94 - 109 mmol/L    Carbon Dioxide (CO2) 25 20 - 32 mmol/L    Anion Gap 6 3 - 14 mmol/L    Urea Nitrogen 45 (H) 7 - 30 mg/dL    Creatinine 3.02 (H) 0.66 - 1.25 mg/dL    Calcium 9.2 8.5 - 10.1 mg/dL    Glucose 95 70 -  99 mg/dL    GFR Estimate 24 (L) >60 mL/min/1.73m2      Comment:      Effective December 21, 2021 eGFRcr in adults is calculated using the 2021 CKD-EPI creatinine equation which includes age and gender (Malick et al., NEJM, DOI: 10.1056/ACNGnu1785151)   **Hemoglobin FUTURE 14d   Result Value Ref Range    Hemoglobin 17.6 13.3 - 17.7 g/dL       If you have any questions or concerns, please call the clinic at the number listed above.       Sincerely,      JACINTA Kay CNP

## 2022-02-02 NOTE — PATIENT INSTRUCTIONS
Poorly-controlled hypertension  Uncontrolled hypertension, is inconsistent with taking medication and has been out for a month or so. Feels doesn't need medications at times. Discussed at length the importance of medication adherence.  Discussed options of setting an alarm or a calendar event on phone daily to remember taking medications.  Also discussed therapy options as below.  Patient does report wife does set up pills in a pillbox.  Provider will also check in on information regarding a timed pill machine.  Patient voices interest in this.  All medications refilled, advised to restart as soon as possible and will get scheduled for a 2-week nurse only blood pressure check.  Like to see patient back in clinic in 6 months.  - Albumin Random Urine Quantitative with Creat Ratio; Future  - Lipid panel reflex to direct LDL Non-fasting; Future  - Basic metabolic panel  (Ca, Cl, CO2, Creat, Gluc, K, Na, BUN); Future  - amLODIPine (NORVASC) 10 MG tablet; Take 1 tablet (10 mg) by mouth daily  - carvedilol (COREG) 25 MG tablet; Take 1 tablet (25 mg) by mouth 2 times daily  - cloNIDine (CATAPRES) 0.1 MG tablet; Take 1 tablet (0.1 mg) by mouth twice daily.  Increase to 2 tablets (0.2 mg) twice daily if blood pressure not below 140/90.  - lisinopril (ZESTRIL) 40 MG tablet; Take 1 tablet (40 mg) by mouth daily    History of multiple strokes  History of multiple strokes, blood pressure uncontrolled as above.  Discussed in depth importance of management of blood pressure and medication adherence as above.  - Lipid panel reflex to direct LDL Non-fasting; Future  - aspirin 81 MG EC tablet; Take 1 tablet (81 mg) by mouth daily  - atorvastatin (LIPITOR) 40 MG tablet; Take 1 tablet (40 mg) by mouth every evening  - clopidogrel (PLAVIX) 75 MG tablet; Take 1 tablet (75 mg) by mouth daily    Stage 3b chronic kidney disease (H)  Chronic, stable.  We will recheck labs today notify patient results and further recommendations.  - Albumin  Random Urine Quantitative with Creat Ratio; Future  - Basic metabolic panel  (Ca, Cl, CO2, Creat, Gluc, K, Na, BUN); Future  - UA Macro with Reflex to Micro and Culture - lab collect; Future  - **Hemoglobin FUTURE 14d; Future    Depression, unspecified depression type  Ongoing depression, partially secondary to stroke.  Started on Lexapro at last office visit, however patient never started.  Discussed at length the importance of mental health and its effect on caring for self.  Discussed therapy as well, patient unsure if this will be helpful.  Strongly encouraged to look into, resources provided and patient instructions.  Patient agreeable to retrial Lexapro.  Would like to do a video visit in 3 to 4 weeks if patient agreeable.  - escitalopram (LEXAPRO) 10 MG tablet; Take 1 tablet (10 mg) by mouth daily for 7 days, THEN 2 tablets (20 mg) daily for 24 days.

## 2022-02-02 NOTE — PROGRESS NOTES
Assessment & Plan     Poorly-controlled hypertension  Uncontrolled hypertension, is inconsistent with taking medication and has been out for a month or so. Feels doesn't need medications at times. Discussed at length the importance of medication adherence.  Discussed options of setting an alarm or a calendar event on phone daily to remember taking medications.  Also discussed therapy options as below.  Patient does report wife does set up pills in a pillbox.  Provider will also check in on information regarding a timed pill machine.  Patient voices interest in this.  All medications refilled, advised to restart as soon as possible and will get scheduled for a 2-week nurse only blood pressure check.  Like to see patient back in clinic in 6 months.  - Albumin Random Urine Quantitative with Creat Ratio; Future  - Lipid panel reflex to direct LDL Non-fasting; Future  - Basic metabolic panel  (Ca, Cl, CO2, Creat, Gluc, K, Na, BUN); Future  - amLODIPine (NORVASC) 10 MG tablet; Take 1 tablet (10 mg) by mouth daily  - carvedilol (COREG) 25 MG tablet; Take 1 tablet (25 mg) by mouth 2 times daily  - cloNIDine (CATAPRES) 0.1 MG tablet; Take 1 tablet (0.1 mg) by mouth twice daily.  Increase to 2 tablets (0.2 mg) twice daily if blood pressure not below 140/90.  - lisinopril (ZESTRIL) 40 MG tablet; Take 1 tablet (40 mg) by mouth daily    History of multiple strokes  History of multiple strokes, blood pressure uncontrolled as above.  Discussed in depth importance of management of blood pressure and medication adherence as above.  - Lipid panel reflex to direct LDL Non-fasting; Future  - aspirin 81 MG EC tablet; Take 1 tablet (81 mg) by mouth daily  - atorvastatin (LIPITOR) 40 MG tablet; Take 1 tablet (40 mg) by mouth every evening  - clopidogrel (PLAVIX) 75 MG tablet; Take 1 tablet (75 mg) by mouth daily    Stage 3b chronic kidney disease (H)  Chronic, stable.  We will recheck labs today notify patient results and further  recommendations.  - Albumin Random Urine Quantitative with Creat Ratio; Future  - Basic metabolic panel  (Ca, Cl, CO2, Creat, Gluc, K, Na, BUN); Future  - UA Macro with Reflex to Micro and Culture - lab collect; Future  - **Hemoglobin FUTURE 14d; Future    Depression, unspecified depression type  Ongoing depression, partially secondary to stroke.  Started on Lexapro at last office visit, however patient never started.  Discussed at length the importance of mental health and its effect on caring for self.  Discussed therapy as well, patient unsure if this will be helpful.  Strongly encouraged to look into, resources provided and patient instructions.  Patient agreeable to retrial Lexapro.  Would like to do a video visit in 3 to 4 weeks if patient agreeable.  - escitalopram (LEXAPRO) 10 MG tablet; Take 1 tablet (10 mg) by mouth daily for 7 days, THEN 2 tablets (20 mg) daily for 24 days.           See Patient Instructions    Return in about 2 weeks (around 2/16/2022) for BP Recheck and 6 months for recheck.    Abiola Schumacher, DNP, APRN-CNP   [facility]     Salty Beasley is a 51 year old male who presents today for the following   health issues:    HPI    Hypertension Follow-up      Do you check your blood pressure regularly outside of the clinic? Yes     Are you following a low salt diet? Yes    Are your blood pressures ever more than 140 on the top number (systolic) OR more   than 90 on the bottom number (diastolic), for example 140/90? Yes      How many servings of fruits and vegetables do you eat daily?  0-1    On average, how many sweetened beverages do you drink each day (Examples: soda, juice, sweet tea, etc.  Do NOT count diet or artificially sweetened beverages)?   0    How many days per week do you exercise enough to make your heart beat faster? 3 or less    How many minutes a day do you exercise enough to make your heart beat faster? 9 or less keeps moving  How many  days per week do you miss taking your medication? 7    What makes it hard for you to take your medications?  ran out and pharmacy would not give more. medications make him sick     Takes them inconsistently - forgets, gets lazy or doesn't think he needs them.         Depression Followup    How are you doing with your depression since your last visit? No change    Are you having other symptoms that might be associated with depression? No    Have you had a significant life event?  Health Concerns     Are you feeling anxious or having panic attacks?   No    Do you have any concerns with your use of alcohol or other drugs? No     Currently not using - never started Lexapro     Social History     Tobacco Use     Smoking status: Never Smoker     Smokeless tobacco: Never Used   Substance Use Topics     Alcohol use: Not Currently     Comment: occ     Drug use: Not Currently     Types: Methamphetamines     PHQ 8/15/2019 5/21/2021 2/2/2022   PHQ-9 Total Score 10 24 24   Q9: Thoughts of better off dead/self-harm past 2 weeks Not at all Not at all Not at all     ZABRINA-7 SCORE 5/21/2021 2/2/2022   Total Score 15 (severe anxiety) 1 (minimal anxiety)   Total Score 15 1     Last PHQ-9 2/2/2022   1.  Little interest or pleasure in doing things 3   2.  Feeling down, depressed, or hopeless 3   3.  Trouble falling or staying asleep, or sleeping too much 3   4.  Feeling tired or having little energy 3   5.  Poor appetite or overeating 3   6.  Feeling bad about yourself 3   7.  Trouble concentrating 3   8.  Moving slowly or restless 3   Q9: Thoughts of better off dead/self-harm past 2 weeks 0   PHQ-9 Total Score 24   Difficulty at work, home, or with people -     ZABRINA-7  2/2/2022   1. Feeling nervous, anxious, or on edge 0   2. Not being able to stop or control worrying 0   3. Worrying too much about different things 0   4. Trouble relaxing 0   5. Being so restless that it is hard to sit still 0   6. Becoming easily annoyed or irritable 0  "  7. Feeling afraid, as if something awful might happen 1   ZABRINA-7 Total Score 1       Suicide Assessment Five-step Evaluation and Treatment (SAFE-T)        Leg pain   Ongoing for years since stroke  Hurts if on for 2 minutes or hours  Sore like after water skiing      Review of Systems    Constitutional, HEENT, cardiovascular, pulmonary, gi and gu systems are negative, except as otherwise noted.    Objective   BP (!) 194/108   Pulse 81   Temp 98.2  F (36.8  C)   Resp 24   Ht 1.778 m (5' 10\")   Wt 87.5 kg (193 lb)   SpO2 98%   BMI 27.69 kg/m    Body mass index is 27.69 kg/m .    Physical Exam  GENERAL APPEARANCE: healthy, alert, no distress and crying  NECK: no adenopathy, no asymmetry, masses, or scars, thyroid normal to palpation and no bruits  RESP: lungs clear to auscultation - no rales, rhonchi or wheezes  CV: regular rates and rhythm, normal S1 S2, no S3 or S4 and no murmur, click or rub and no edema  ABDOMEN: soft, nontender, without hepatosplenomegaly or masses and bowel sounds normal  MS: extremities normal- no gross deformities noted, right sided weakness and peripheral pulses normal  SKIN: no suspicious lesions or rashes  NEURO: Normal strength and tone, mentation intact and speech slow and slightly slurred   PSYCH: mentation appears normal, affect flat and crying    Diagnostic Test Results:  Results for orders placed or performed in visit on 02/02/22   Lipid panel reflex to direct LDL Non-fasting     Status: Abnormal   Result Value Ref Range    Cholesterol 194 <200 mg/dL    Triglycerides 128 <150 mg/dL    Direct Measure HDL 47 >=40 mg/dL    LDL Cholesterol Calculated 121 (H) <=100 mg/dL    Non HDL Cholesterol 147 (H) <130 mg/dL    Patient Fasting > 8hrs? No     Narrative    Cholesterol  Desirable:  <200 mg/dL    Triglycerides  Normal:  Less than 150 mg/dL  Borderline High:  150-199 mg/dL  High:  200-499 mg/dL  Very High:  Greater than or equal to 500 mg/dL    Direct Measure HDL  Female:  Greater " than or equal to 50 mg/dL   Male:  Greater than or equal to 40 mg/dL    LDL Cholesterol  Desirable:  <100mg/dL  Above Desirable:  100-129 mg/dL   Borderline High:  130-159 mg/dL   High:  160-189 mg/dL   Very High:  >= 190 mg/dL    Non HDL Cholesterol  Desirable:  130 mg/dL  Above Desirable:  130-159 mg/dL  Borderline High:  160-189 mg/dL  High:  190-219 mg/dL  Very High:  Greater than or equal to 220 mg/dL   Basic metabolic panel  (Ca, Cl, CO2, Creat, Gluc, K, Na, BUN)     Status: Abnormal   Result Value Ref Range    Sodium 136 133 - 144 mmol/L    Potassium 4.4 3.4 - 5.3 mmol/L    Chloride 105 94 - 109 mmol/L    Carbon Dioxide (CO2) 25 20 - 32 mmol/L    Anion Gap 6 3 - 14 mmol/L    Urea Nitrogen 45 (H) 7 - 30 mg/dL    Creatinine 3.02 (H) 0.66 - 1.25 mg/dL    Calcium 9.2 8.5 - 10.1 mg/dL    Glucose 95 70 - 99 mg/dL    GFR Estimate 24 (L) >60 mL/min/1.73m2   **Hemoglobin FUTURE 14d     Status: Normal   Result Value Ref Range    Hemoglobin 17.6 13.3 - 17.7 g/dL        Chart documentation with Dragon Voice recognition Software. Although reviewed after completion, some words and grammatical errors may remain.          Answers for HPI/ROS submitted by the patient on 2/2/2022  PHQ9 TOTAL SCORE: 24  ZABRINA 7 TOTAL SCORE: 1

## 2022-02-02 NOTE — TELEPHONE ENCOUNTER
Patient Quality Outreach    Patient is due for the following:   Hypertension -  BP check  Depression  -  PHQ-9 Needed (done today)    NEXT STEPS:   Schedule a nurse only visit for BP check and urine for lab    Type of outreach:    Phone, spoke to patient/parent. lab and RN appointment scheduled in 2 weeks      Questions for provider review:    None     Poornima Duran [facility]  Chart routed to Care Team.

## 2022-02-03 ASSESSMENT — ANXIETY QUESTIONNAIRES: GAD7 TOTAL SCORE: 1

## 2022-02-03 ASSESSMENT — PATIENT HEALTH QUESTIONNAIRE - PHQ9: SUM OF ALL RESPONSES TO PHQ QUESTIONS 1-9: 24

## 2022-03-02 ENCOUNTER — TELEPHONE (OUTPATIENT)
Dept: FAMILY MEDICINE | Facility: CLINIC | Age: 52
End: 2022-03-02
Payer: COMMERCIAL

## 2022-03-02 NOTE — TELEPHONE ENCOUNTER
Reason for Call:  Other     Detailed comments: Farzaneh from [facility] says patient is having pain in a tooth and they need to extract it but they need to know if it is OK to do this due this. They are checking on his IINR and he has issues with his Blood pressure and patient reports he has had several strokes.     Phone Number Farzaneh from [facility] can be reached at:  [phone]    Best Time: Please call as soon as able as patient is having pain and they would like to schedule this soon.     Call taken on 3/2/2022 at 9:09 AM by Ioana Enriquez

## 2022-03-03 NOTE — TELEPHONE ENCOUNTER
I would prefer patient have his blood pressure under control first, please advise dentistry.  Also, patient was supposed to come in for a nurse only blood pressure check, please contact and have his schedule so that we can proceed with his dental work.    Thanks,  Abiola Joseph, DNP, APRN-CNP

## 2022-03-03 NOTE — TELEPHONE ENCOUNTER
Call placed to dental office. Reviewed primary recommendations with Farzaneh.    Call placed to patient.    No Answer and mail box is full.

## 2022-03-10 ENCOUNTER — ALLIED HEALTH/NURSE VISIT (OUTPATIENT)
Dept: FAMILY MEDICINE | Facility: CLINIC | Age: 52
End: 2022-03-10
Payer: COMMERCIAL

## 2022-03-10 ENCOUNTER — TELEPHONE (OUTPATIENT)
Dept: FAMILY MEDICINE | Facility: CLINIC | Age: 52
End: 2022-03-10

## 2022-03-10 VITALS — HEART RATE: 96 BPM | DIASTOLIC BLOOD PRESSURE: 100 MMHG | SYSTOLIC BLOOD PRESSURE: 164 MMHG

## 2022-03-10 DIAGNOSIS — Z86.73 HISTORY OF MULTIPLE STROKES: ICD-10-CM

## 2022-03-10 DIAGNOSIS — I10 POORLY-CONTROLLED HYPERTENSION: Primary | ICD-10-CM

## 2022-03-10 DIAGNOSIS — N18.32 STAGE 3B CHRONIC KIDNEY DISEASE (H): ICD-10-CM

## 2022-03-10 DIAGNOSIS — F32.A DEPRESSION, UNSPECIFIED DEPRESSION TYPE: ICD-10-CM

## 2022-03-10 DIAGNOSIS — I63.9 ACUTE CVA (CEREBROVASCULAR ACCIDENT) (H): ICD-10-CM

## 2022-03-10 PROCEDURE — 99207 PR NO CHARGE NURSE ONLY: CPT

## 2022-03-10 NOTE — TELEPHONE ENCOUNTER
Current complaints: none  Disposition:  huddled with provider. RN discussed with Leisa Alonzo NP, in PCP absence. Would increase clonidine to 0.3 mg BID but if pt not taking AM meds would advise to begin taking AM doses. Suggest neph referral.   RN again advised importance of taking AM med doses in addition to PM. Reports barriers to taking AM med doses:  -Not certain of meds and which ones to take BID even after reviewing with pt.  - Avoids taking meds on empty stomach in AM which causes dizziness, feels sick. Does not eat until noon.  Offered to contact Bella to review meds/ directions- states this is ex- wife. Prefers she not be called. Questioned if has ever had homecare services- states does not want anyone coming to home.   Pt asking if Abiola would recommend PT.  Forwarded to Abiola for review, recommendations. CC referral again? (referred 01-19-21, contacted with no reply)  JAELYN Olivares RN

## 2022-03-10 NOTE — PROGRESS NOTES
Salty Beasley is a 51 year old year old patient who comes in today for a Blood Pressure check because of ongoing monitoring- restart medication.   BP Readings from Last 6 Encounters:   03/10/22 (!) 164/100   02/02/22 (!) 194/108   08/29/19 (!) 170/110   08/15/19 (!) 196/104   01/22/08 130/90   Initial /106 rt arm. HR 96  Recheck lt arm 164/100.  Patient is not taking medication as prescribed- Taking all meds in PM. Does not take AM clonidine or carvedilol.   Patient is tolerating medications well- dionisio only doses with food. States one of reasons does not take .  Patient is monitoring Blood Pressure at home.  One BP reading today 117/112??  Current complaints: none  Disposition:  huddled with provider. RN discussed with Leisa Alonzo NP, in PCP absence. Would increase clonidine to 0.3 mg BID but if pt not taking AM meds would advise to begin taking AM doses. Suggest neph referral.   RN again advised importance of taking AM med doses in addition to PM. Reports barriers to taking AM med doses:  -Not certain of meds and which ones to take BID even after reviewing with pt.  - Avoids taking meds on empty stomach in AM which causes dizziness, feels sick. Does not eat until noon.  Offered to contact Bella to review meds/ directions- states this is ex- wife. Prefers she not be called. Questioned if has ever had homecare services- states does not want anyone coming to home.   Pt asking if Abiola would recommend PT.  Forwarded to Abiola for review, recommendations. CC referral again? (referred 01-19-21, contacted with no reply)  JAELYN Olivares RN

## 2022-03-11 ENCOUNTER — PATIENT OUTREACH (OUTPATIENT)
Dept: NURSING | Facility: CLINIC | Age: 52
End: 2022-03-11
Payer: COMMERCIAL

## 2022-03-11 NOTE — LETTER
[facility] CARE COORDINATION    March 11, 2022    Salty Beasley  [address]  [place] [address]      Dear Salty,    I am a clinic community health worker who works with [facility]. I wanted to thank you for spending the time to talk with me.  Below is a description of clinic care coordination and how I can further assist you.      The clinic care coordination team is made up of a registered nurse,  and community health worker who understand the health care system. The goal of clinic care coordination is to help you manage your health and improve access to the health care system in the most efficient manner. The team can assist you in meeting your health care goals by providing education, coordinating services, strengthening the communication among your providers and supporting you with any resource needs.    Please feel free to contact the Community Health Worker Edyta at [phone] with any questions or concerns. We are focused on providing you with the highest-quality healthcare experience possible and that all starts with you.     Sincerely,     Preeti Newberry  Community Health Worker  [facility]  Clinic Care Coordination

## 2022-03-11 NOTE — TELEPHONE ENCOUNTER
Rec'd call back from [facility] intake who will be able to accept referral for assessment wk of 03-13-22. Faxed referral, face sheet, med list, 02-02-22 visit note to [phone].   Pt informed referral placed, to expect call from [facility] to schedule visit next week.  See CC outreach note today pt declined CC services.  Reports home BP now 198/124. Denies sx HTN.  Forwarded to Abiola as GIORGIO Olivares, RN

## 2022-03-11 NOTE — TELEPHONE ENCOUNTER
Will start with MTM and Care Coordination Referral to start. Can we update patient on plans and try to encourage him to take his morning pills (tell him which ones specifically) and take with something little to eat? Until we can get him in to see MTM and CC.     Thanks,  Abiola Joseph, DNP, APRN-CNP

## 2022-03-11 NOTE — TELEPHONE ENCOUNTER
Spoke with pt, informed of recommendations, referrals per Abiola. Pt reluctantly accepting, agreeable.  Re- visited homecare referral and services available if insurance would cover: RN for med mngmnt, possibly PT, MSW for assist with finances, resources, possibly homemaking. Pt reluctantly agreeable to homecare referral.  Also, asked again if OK to contact Bella who he states is ex- wife (consent to comm on file dated 08-15-19, listed as emergency contact). Pt gave verbal permission to contact Bella re: above information and to see if she is able to help pt with med management.  Has not checked BP yet today, states he will. Has not taken AM BID meds today.   LM for Bella, emerg contact to call clinic RN.   Homecare referral pended, forwarded to Abiola for review, completion.   [facility] for [facility] intake [phone] re: pending referral to see if able to accept.  JAELYN Olivares RN

## 2022-03-11 NOTE — PROGRESS NOTES
"Clinic Care Coordination Contact  Community Health Worker Initial Outreach    Patient accepts CC: No, not interested \"patient stated there is nothing anyone can do to help\". Patient will be sent Care Coordination introduction letter for future reference.     Preeti Newberry  [facility] Health Worker  [facility] Care Coordination   Office: [phone]     "

## 2022-03-11 NOTE — TELEPHONE ENCOUNTER
Bella returned call. She does try and set up Salty's medications. She stretches them out over 3 times daily. She says he takes about 13 meds. including supplements and states it is too much to do twice daily. She will talk to Salty over the weekend and encourage compliance with home care and also talk to him about getting [facility] pharmacy involved. She agrees that would be a good idea. She would be able to participate with Salty if the visit could be virtual. She will call back on Monday to get things set up.  Poornima TAM RN

## 2022-03-12 ENCOUNTER — HEALTH MAINTENANCE LETTER (OUTPATIENT)
Age: 52
End: 2022-03-12

## 2022-03-14 ENCOUNTER — TELEPHONE (OUTPATIENT)
Dept: FAMILY MEDICINE | Facility: CLINIC | Age: 52
End: 2022-03-14
Payer: COMMERCIAL

## 2022-03-14 NOTE — TELEPHONE ENCOUNTER
Reason for Call:  Other    Detailed comments: [facility] calling because of home care orders put in for pt from Abiola Joseph. They are calling because they are having to decline this order due to their capacity.    Phone Number can be reached at: Other phone number:  [phone]  [place] - [facility]    Best Time: anytime    Can we leave a detailed message on this number? YES    Call taken on 3/14/2022 at 4:25 PM by Kimberly Murcia

## 2022-03-14 NOTE — TELEPHONE ENCOUNTER
Referral intended for [facility] with SN assessment visit to be scheduled this week.  JAELYN Olivares RN

## 2022-03-15 ENCOUNTER — TELEPHONE (OUTPATIENT)
Dept: FAMILY MEDICINE | Facility: CLINIC | Age: 52
End: 2022-03-15
Payer: COMMERCIAL

## 2022-03-15 NOTE — TELEPHONE ENCOUNTER
PC from [facility], who states did not receive referral, info which was faxed 03-11-22. Re- faxed referral, face sheet, med lost, 02-02-22 OV notes. Informed of 03-11-22 conversation with Bella, ex- wife/ emergency contact (documented in 03-10-22 TE) who is involved in med set up. Informed pt referred to MTM, wife would like to be present for VV. Do not see MTM outreach to pt yet.  LM for MTM to call clinic DAVID Olivares, RN

## 2022-03-15 NOTE — TELEPHONE ENCOUNTER
Spoke with MTM who will contact pt. Nova Blanco, pt's ex- wife/ emergency contact involved in med set up and would like to be present for VV.  JAELYN Olivares RN

## 2022-03-18 ENCOUNTER — TELEPHONE (OUTPATIENT)
Dept: FAMILY MEDICINE | Facility: CLINIC | Age: 52
End: 2022-03-18
Payer: COMMERCIAL

## 2022-03-18 NOTE — TELEPHONE ENCOUNTER
PC from Jesus RN, [facility], informing pt was assessed today. Requesting orders for SN wkly x9 wks plus 2 PRN visits for med teaching, management/ set up, general assess; provide resources. Verbal order give approving orders.  BP today 136/82. Confirmed pt taking clonidine 0.1 mg- set up for BID along with other meds.  Forwarded to Abiola as GIORGIO Olivares RN

## 2022-03-20 ENCOUNTER — MEDICAL CORRESPONDENCE (OUTPATIENT)
Dept: HEALTH INFORMATION MANAGEMENT | Facility: CLINIC | Age: 52
End: 2022-03-20
Payer: COMMERCIAL

## 2022-03-25 ENCOUNTER — TELEPHONE (OUTPATIENT)
Dept: FAMILY MEDICINE | Facility: CLINIC | Age: 52
End: 2022-03-25
Payer: COMMERCIAL

## 2022-03-25 NOTE — TELEPHONE ENCOUNTER
Reason for Call:  Home Health Care  :Salty is not taking his meds since last Wed  At least a full week with no meds FYI  Any questions call Jessie  [phone] [facility]     Pt Provider: Orly Joseph    Can we leave a detailed message on this number? YES  Best Time: any    Call taken on 3/25/2022 at 8:53 AM by Inez Mark

## 2022-03-30 ENCOUNTER — TELEPHONE (OUTPATIENT)
Dept: FAMILY MEDICINE | Facility: CLINIC | Age: 52
End: 2022-03-30
Payer: COMMERCIAL

## 2022-03-30 NOTE — TELEPHONE ENCOUNTER
Reason for Call:  FYI    Detailed comments: Kathi calling from [facility] - she wanted to inform pt's provider Abiola Joseph that she was talking with pt and getting him set up for an appointment in regards to his major depression. His depression has been really bad recently and has had dreams of harming/hurting himself and others. Pt states he has not had these dreams the past couple of nights. Kathi set pt up with an appointment with Abiola for 4/20, but she wanted to make Abiola aware of the situation at hand. Pt refused EMS when speaking with Kathi, but was okay to set up an appointment with his primary.     Phone Number can be reached at: Other phone number:  [phone]  [facility] - [facility]    Best Time: anytime    Can we leave a detailed message on this number? YES    Call taken on 3/30/2022 at 12:32 PM by Kimberly Murcia

## 2022-04-01 NOTE — TELEPHONE ENCOUNTER
LM for SAM Roman, to call care team.  Is is possible to coordinate VV with Abiola and  visit Tues 04-05-22 to assist pt with technical piece of VV, ensure compliance with appt, discuss meds/ concerns?   Looks like MTM has tried reaching out to pt 03-15-22, VM box full unable to LM.  Request SN assist on coordinating MTM visit also.   To also request home OT assess for residual cognitive effects s/p CVA.  JAELYN Olivares RN

## 2022-04-01 NOTE — TELEPHONE ENCOUNTER
Noted, have discussed with patient at past appointments. Will discuss again at appointment on 4/20.    Abiola Joseph DNP, APRN-CNP

## 2022-04-01 NOTE — TELEPHONE ENCOUNTER
Follow-up with patient/homecare on getting earlier appointment, even virtual next week regarding updates/depression meds.     Also, reach out to MTM, ?No outreach or appointment scheduled.     Maybe OT for residual cognitive effects 2/2 CVA.     Abiola Joseph, DNP, APRN-CNP

## 2022-04-05 NOTE — TELEPHONE ENCOUNTER
Spoke with pt who is agreeable with VV tomorrow with SAM Roman present.  Informed of OT eval, purpose. Jessie will set this up.  SAM Roman, informed. Also gave number to MT to set up VV.  JAELYN Olivares RN

## 2022-04-05 NOTE — TELEPHONE ENCOUNTER
Spoke with SAM Roman. Discussed med non- compliance, depression sx, living situation (has several people living with him- not sure paying rent).   Pt denies substance use, RN not detecting any sub use. Joshuafe has not been involved since initial SN assessment.  Gave VO for OT eval. Also informed MTM referral given but unable to contact pt (VM box full).  Arranged coordinated SN visit/ VV with Abiola tomorrow AM.   Attempt to contact pt to inform/ obtain consent. VM box full, unable to LM. Re- call.  JAELYN Olivares RN

## 2022-04-06 ENCOUNTER — VIRTUAL VISIT (OUTPATIENT)
Dept: FAMILY MEDICINE | Facility: CLINIC | Age: 52
End: 2022-04-06
Payer: COMMERCIAL

## 2022-04-06 VITALS
SYSTOLIC BLOOD PRESSURE: 190 MMHG | WEIGHT: 182 LBS | HEART RATE: 96 BPM | DIASTOLIC BLOOD PRESSURE: 115 MMHG | BODY MASS INDEX: 26.11 KG/M2 | OXYGEN SATURATION: 95 % | TEMPERATURE: 96.1 F

## 2022-04-06 DIAGNOSIS — F32.A DEPRESSION, UNSPECIFIED DEPRESSION TYPE: ICD-10-CM

## 2022-04-06 DIAGNOSIS — Z86.73 HISTORY OF MULTIPLE CEREBROVASCULAR ACCIDENTS (CVAS): ICD-10-CM

## 2022-04-06 DIAGNOSIS — I10 POORLY-CONTROLLED HYPERTENSION: Primary | ICD-10-CM

## 2022-04-06 PROCEDURE — 99214 OFFICE O/P EST MOD 30 MIN: CPT | Mod: 95 | Performed by: NURSE PRACTITIONER

## 2022-04-06 RX ORDER — CARVEDILOL PHOSPHATE 40 MG/1
40 CAPSULE, EXTENDED RELEASE ORAL DAILY
Qty: 90 CAPSULE | Refills: 0 | Status: SHIPPED | OUTPATIENT
Start: 2022-04-06 | End: 2022-04-08

## 2022-04-06 NOTE — PROGRESS NOTES
Salty is a 51 year old who is being evaluated via a billable video visit.      How would you like to obtain your AVS? Mail a copy  If the video visit is dropped, the invitation should be resent by: Text to cell phone: . [phone]  Will anyone else be joining your video visit? Yes: SAM Roman, Nancy will be present- [phone]. How would they like to receive their invitation? Text to cell phone: [phone]      Video Start Time: 10:06 AM    Assessment & Plan     Poorly-controlled hypertension  Chronic, has been having difficulty controlling blood pressure mostly due to forgetting to take medications. HomeCare involved and setting up medications for patient, however still doesn't take. Has only taken evening dose of medications 3 days this last week otherwise has not taken any. Blood pressure still significantly elevated. Has a significant history of multiple strokes. Depression also contributing to not taking medications. Discussed with patient and home care nurse will have all medications changed to take in the evening, the Clonidine will still be two times daily, however discussed maybe setting up to take around noon after patient gets up and moving. Will also change Carvedilol to extended release to avoid twice daily dosing for better compliance. Discussed setting an alarm on his phone to remind him to take his medications. Would like follow up in approximately 1 month - okay for virtual with Home care nurse if able.   - carvedilol ER (COREG CR) 40 MG 24 hr capsule; Take 1 capsule (40 mg) by mouth daily    History of multiple cerebrovascular accidents (CVAs)  History of multiple strokes. Affecting cognition and still some weakness. Home Care now involved, new OT orders placed. Blood pressure as above, attempting to better control to prevent from stroke.     Depression, unspecified depression type  Ongoing, worsened since strokes. Started Lexapro and then stopped. Discussed this will take approximately 1  "month to really notice changes. Highly recommend restarting, can change to evening to improve compliance. Follow-up in 1 month as above.                BMI:   Estimated body mass index is 26.11 kg/m  as calculated from the following:    Height as of 2/2/22: 1.778 m (5' 10\").    Weight as of this encounter: 82.6 kg (182 lb).       See Patient Instructions    Return in about 1 month (around 5/6/2022) for Recheck via virtual visit with HomeCare.    Abiola Joseph, RASHEL, APRN-CNP   [facility]    Lillian Pond is a 51 year old who presents for the following health issues  accompanied by his home care Jessie VARGAS.    HPI   Chief Complaint   Patient presents with     Hypertension     Depression     Medication Reconciliation     RN set up pill box one week ago and patient has not taken any       Hypertension Follow-up      Do you check your blood pressure regularly outside of the clinic? Yes     RN takes    Are you following a low salt diet? No    Are your blood pressures ever more than 140 on the top number (systolic) OR more   than 90 on the bottom number (diastolic), for example 140/90? Yes      How many servings of fruits and vegetables do you eat daily?  0-1    On average, how many sweetened beverages do you drink each day (Examples: soda, juice, sweet tea, etc.  Do NOT count diet or artificially sweetened beverages)?   0    How many days per week do you exercise enough to make your heart beat faster? 3 or less  How many minutes a day do you exercise enough to make your heart beat faster? 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 7    What makes it hard for you to take your medications?  patient declines    Gets dizzy and tired when takes medications sometimes   Forgets to take in the mornings, not sure why - ?2/2 CVA   Would rather take medications in the evening " "  Right now, setting up some medications in the a.m. and some in the evening   Patient took 3 days of medications this last week in the evening and then refilled the pill box  Usually gets up for the day around 10-11 a.m. and then relaxes for an hour and then does stuff around the house until he goes to bed. Goes to bed whenever, no set time.     Review of Systems   Constitutional, HEENT, cardiovascular, pulmonary, gi and gu systems are negative, except as otherwise noted.      Objective           Vitals:  Chief Complaint   Patient presents with     Hypertension     Depression     Medication Reconciliation     RN set up pill box one week ago and patient has not taken any     BP (!) 190/115   Pulse 96   Temp (!) 96.1  F (35.6  C)   Wt 82.6 kg (182 lb)   SpO2 95%   BMI 26.11 kg/m   Estimated body mass index is 26.11 kg/m  as calculated from the following:    Height as of 2/2/22: 1.778 m (5' 10\").    Weight as of this encounter: 82.6 kg (182 lb).  BP Readings from Last 3 Encounters:   04/06/22 (!) 190/115   03/10/22 (!) 164/100   02/02/22 (!) 194/108     Wt Readings from Last 5 Encounters:   04/06/22 82.6 kg (182 lb)   02/02/22 87.5 kg (193 lb)   08/29/19 84.4 kg (186 lb)   08/15/19 84.7 kg (186 lb 12.8 oz)   01/22/08 74.4 kg (164 lb)       Health Maintenance that is potentially due pending provider review          Physical Exam   GENERAL: Healthy, alert and no distress  EYES: Eyes grossly normal to inspection.  No discharge or erythema, or obvious scleral/conjunctival abnormalities.  RESP: No audible wheeze, cough, or visible cyanosis.  No visible retractions or increased work of breathing.    SKIN: Visible skin clear. No significant rash, abnormal pigmentation or lesions.  NEURO: Cranial nerves grossly intact.  Mentation and speech appropriate for age.  PSYCH: mentation appears normal and affect flat    Diagnostic Test Results:  none            Video-Visit Details    Type of service:  Video Visit    Video End " Time:10:30 AM    Originating Location (pt. Location): Home    Distant Location (provider location):  [facility]     Platform used for Video Visit: Wiley

## 2022-04-06 NOTE — PATIENT INSTRUCTIONS
Poorly-controlled hypertension  Chronic, has been having difficulty controlling blood pressure mostly due to forgetting to take medications. HomeCare involved and setting up medications for patient, however still doesn't take. Has only taken evening dose of medications 3 days this last week otherwise has not taken any. Blood pressure still significantly elevated. Has a significant history of multiple strokes. Depression also contributing to not taking medications. Discussed with patient and home care nurse will have all medications changed to take in the evening, the Clonidine will still be two times daily, however discussed maybe setting up to take around noon after patient gets up and moving. Will also change Carvedilol to extended release to avoid twice daily dosing for better compliance. Discussed setting an alarm on his phone to remind him to take his medications. Would like follow up in approximately 1 month - okay for virtual with Home care nurse if able.   - carvedilol ER (COREG CR) 40 MG 24 hr capsule; Take 1 capsule (40 mg) by mouth daily    History of multiple cerebrovascular accidents (CVAs)  History of multiple strokes. Affecting cognition and still some weakness. Home Care now involved, new OT orders placed. Blood pressure as above, attempting to better control to prevent from stroke.     Depression, unspecified depression type  Ongoing, worsened since strokes. Started Lexapro and then stopped. Discussed this will take approximately 1 month to really notice changes. Highly recommend restarting, can change to evening to improve compliance. Follow-up in 1 month as above.

## 2022-04-06 NOTE — NURSING NOTE
"Chief Complaint   Patient presents with     Hypertension     Depression     Medication Reconciliation     RN set up pill box one week ago and patient has not taken any       Initial BP (!) 190/115   Pulse 96   Temp (!) 96.1  F (35.6  C)   Wt 82.6 kg (182 lb)   SpO2 95%   BMI 26.11 kg/m   Estimated body mass index is 26.11 kg/m  as calculated from the following:    Height as of 2/2/22: 1.778 m (5' 10\").    Weight as of this encounter: 82.6 kg (182 lb).   Call placed to [facility] in [place] please fill patient aspirin 81mg so he can pick with the coreg    "

## 2022-04-07 ENCOUNTER — TELEPHONE (OUTPATIENT)
Dept: FAMILY MEDICINE | Facility: CLINIC | Age: 52
End: 2022-04-07
Payer: COMMERCIAL

## 2022-04-07 DIAGNOSIS — I10 POORLY-CONTROLLED HYPERTENSION: Primary | ICD-10-CM

## 2022-04-07 NOTE — TELEPHONE ENCOUNTER
Reason for Call:  KAREN    Detailed comments: Jessie [facility] Nurse is calling and stating the pt could not  his med's but will today.    They will go out on Monday, April 11, to set up med.   He did although take his medication yesterday.  Today's BP is 160/110 P 60    Phone Number Patient can be reached at: Other phone number:  Jessie is at [phone]    Best Time: any    Can we leave a detailed message on this number? YES    Call taken on 4/7/2022 at 10:15 AM by Bella Cedeño

## 2022-04-08 RX ORDER — METOPROLOL SUCCINATE 50 MG/1
50 TABLET, EXTENDED RELEASE ORAL DAILY
Qty: 30 TABLET | Refills: 0 | Status: SHIPPED | OUTPATIENT
Start: 2022-04-08 | End: 2022-05-16

## 2022-04-08 NOTE — TELEPHONE ENCOUNTER
Please notify patient insurance not covering Coreg extended release, however will cover an alternate. Will have patient start Metoprolol ER instead, still in the evening.     Thanks,  Abiola Joseph, DNP, APRN-CNP

## 2022-04-08 NOTE — TELEPHONE ENCOUNTER
Additional information is requested from [facility] insurance.  Chart notes or documentation stating why he cannot try one of these formulary alternatives:  Atenolol, metoprolol ER, bisoprolol, nadolol and propranolol IR tab or ER caps.     Thanks    Marianne Alonzo RT(R)  [facility]  X-Ray [phone]

## 2022-04-11 ENCOUNTER — TELEPHONE (OUTPATIENT)
Dept: FAMILY MEDICINE | Facility: CLINIC | Age: 52
End: 2022-04-11
Payer: COMMERCIAL

## 2022-04-11 NOTE — TELEPHONE ENCOUNTER
See today JONATHAN Roman RN, Nancy.  LM informing of med change. Request she inform pt at next SN visit with med set up.  JAELYN Olivares RN

## 2022-04-11 NOTE — TELEPHONE ENCOUNTER
Reason for Call:  Other prescription    Detailed comments:   Med Questions New Med Metoprolol  & Coreg is this med being discontinued with starting new med.      Phone Number Patient can be reached at: Other phone number:  Jessie Cruz  [phone]*    Best Time: Any Time      Can we leave a detailed message on this number? YES    Call taken on 4/11/2022 at 10:05 AM by Chandrika Blair

## 2022-04-11 NOTE — TELEPHONE ENCOUNTER
See 04-07-22 TE-   Per Abiola-  Note     Please notify patient insurance not covering Coreg extended release, however will cover an alternate. Will have patient start Metoprolol ER instead, still in the evening.      Thanks,  Abiola Joseph, RASHEL, APRN-CNP            LM on Jessie's secure VM informing of above med change due to formulary issue.  Request Jessie call clinic RN if further questions/ concerns re: med change.  JAELYN Olivares RN

## 2022-04-11 NOTE — TELEPHONE ENCOUNTER
SAM Roman returned call. Confirmed Coreg D/C'd; metoprolol prescribed. Pt did  med and RN set up in med box today. Pt has taken meds 3/7 eves since meds last set up 04-06-22 which she feels is progress.   BP today 178/106, HR 57. Previous HR readings 60's- 80's. Next SN visit scheduled 04-18-22.  OT eval scheduled tomorrow 10 AM.  Informed pt has in person OV scheduled with Abiola 04-20-22.   Forwarded to Abiola for review, any further recommendations.  JAELYN Olivares RN

## 2022-04-12 NOTE — TELEPHONE ENCOUNTER
This is progress. Thanks for the update. Would continue to monitor the heart rate however, can address at office visit if still running low.     Thanks,  Abiola Joseph, DNP, APRN-CNP

## 2022-04-18 ENCOUNTER — TELEPHONE (OUTPATIENT)
Dept: FAMILY MEDICINE | Facility: CLINIC | Age: 52
End: 2022-04-18
Payer: COMMERCIAL

## 2022-04-18 NOTE — TELEPHONE ENCOUNTER
Reason for Call:  Other    Detailed comments: Update on pt: Pt did take 5 of his evening does and 1 of his AM doses of medication. Blood pressure today was 176/101, pulse was 61. Nurse stated that pt did look slightly better today physically then he has looked in the past. Pt was not feeling well last week so he turned away OT, they will try and reschedule with him this week.    Phone Number can be reached at: Other phone number:  [phone]  [facility]    Best Time: anytime    Can we leave a detailed message on this number? YES    Call taken on 4/18/2022 at 11:52 AM by Kimberly Murcia

## 2022-04-19 ENCOUNTER — TELEPHONE (OUTPATIENT)
Dept: FAMILY MEDICINE | Facility: CLINIC | Age: 52
End: 2022-04-19
Payer: COMMERCIAL

## 2022-04-19 NOTE — TELEPHONE ENCOUNTER
Reason for Call: Home Care Verbal Order      Detailed comments:   OT Home health 1 x week 4 weeks  PT Eval and treat lower extremity weakness    [facility] [phone]    Call taken on 4/19/2022 at 1:07 PM by Chandrika Blair

## 2022-04-19 NOTE — TELEPHONE ENCOUNTER
"Spoke with Nanette OT- gave verbal orders for OT/ PT.  Update per her initial visit- feels pt \"severely depressed\"  Not interested in any type of therapy. Son moved in with him 2 wks ago which seems to be helping some- per SN info.  OT plans further cognitive assess, assist with routine task/ hskpg. Per SN- pt still not taking AM meds but has taken 5/7 PM doses past week which has improved.   Will add PT to L/E strengthening.   Informed pt has F/U appt with Abiola tomorrow.  RN contacted pt to remind of appt. States he is able to attend. Gave arrive time.  Forwarded to Abiola as KAREN.  JAELYN Olivares RN    "

## 2022-04-20 ENCOUNTER — TELEPHONE (OUTPATIENT)
Dept: FAMILY MEDICINE | Facility: CLINIC | Age: 52
End: 2022-04-20

## 2022-04-20 NOTE — TELEPHONE ENCOUNTER
Form placed on Abiola Joseph's desk for [employer].    Marcella Arteaga on 4/20/2022 at 10:03 AM

## 2022-04-28 ENCOUNTER — TELEPHONE (OUTPATIENT)
Dept: FAMILY MEDICINE | Facility: CLINIC | Age: 52
End: 2022-04-28
Payer: COMMERCIAL

## 2022-04-28 NOTE — TELEPHONE ENCOUNTER
Reason for Call:  Other    Detailed comments: Pt's home care nurse calling because she saw pt today for his nurse visit and wanted to inform Abiola that pt has not been taking his medication. This is an ongoing thing with pt. He had spilled all his meds on the floor and had them in a plastic bag. Nurse got him sorted with new meds and reiterated to him the importance of being a med regiment, and will follow up with him next week.    Phone Number Patient can be reached at: Other phone number:  [phone]  [facility]    Best Time: anytime    Can we leave a detailed message on this number? YES    Call taken on 4/28/2022 at 3:42 PM by Kimberly Murcia

## 2022-05-04 ENCOUNTER — TELEPHONE (OUTPATIENT)
Dept: FAMILY MEDICINE | Facility: CLINIC | Age: 52
End: 2022-05-04
Payer: COMMERCIAL

## 2022-05-04 NOTE — TELEPHONE ENCOUNTER
Reason for Call:  Other    Detailed comments: Home Care calling because they saw pt yesterday for med setup, pt still being noncompliant with his meds. Took 3 or 4 evening doses and 1 morning does in the last week. Pt stated not feeling well and feeling nauseous. No vomiting and no fever. Home Care nurse offered to call ambulance since pt was not feeling well, pt refused ambulance.   BP: 192/117  Oxygen: 95%  Pulse: 96  Lungs sounds clear    Phone Number can be reached at: Other phone number:  [phone]  [facility]    Best Time: anytime    Can we leave a detailed message on this number? YES    Call taken on 5/4/2022 at 11:33 AM by Kimberly Murcia

## 2022-05-12 ENCOUNTER — TELEPHONE (OUTPATIENT)
Dept: FAMILY MEDICINE | Facility: CLINIC | Age: 52
End: 2022-05-12
Payer: COMMERCIAL

## 2022-05-12 NOTE — TELEPHONE ENCOUNTER
Reason for Call:  Home Care Verbal Order      Detailed comments:   Recert home care services today.   Nursing - 1 x week for med management , B/P   Pt is complaint to pm doses and Non complaint to Am doses  All his meds.   Vitals today B/P 187/115, P 75 Temp 97 O2-98%       Phone Number HC can be reached at: Other phone number:  Jessie [phone]*    Best Time: Any Time      Can we leave a detailed message on this number? YES    Call taken on 5/12/2022 at 3:28 PM by Chandrika Blair

## 2022-05-12 NOTE — TELEPHONE ENCOUNTER
Spoke with Sangeeta who states feels pt would benefit from wheelchair (possibly motorized W/C) as unable to stand/ walk for any length of time/ distance. Eval scheduled 05-27-22. Will need face to face prior to eval, will hopefully coordinate VV with SN or OT.   Pt has in past worked with [facility] for assistance but does not follow through with paperwork. SN has requested cty assessment to see pt would qualify for PCA services to take him to appts, hskpg, as well as financial assist. Hoping to coordinate visit with SN visit.  BP remains high, non- compliant with taking meds.  Gave verbal approval for OT visits as requested.   Forwarded to Abiola for review, further recommendations.  JAELYN Olivares RN

## 2022-05-12 NOTE — TELEPHONE ENCOUNTER
Reason for Call:  Other    Detailed comments: Requesting continue of home care for OT for 1 time for 4 weeks starting next week. They are working on getting him a wheelchair. Blood pressure today was 200/120, they have been monitoring this as he has had continuous high blood pressure.    Phone Number Patient can be reached at: Other phone number:  [phone]  [facility]    Best Time: anytime    Can we leave a detailed message on this number? YES    Call taken on 5/12/2022 at 3:09 PM by Kimberly Murcia

## 2022-05-20 ENCOUNTER — TELEPHONE (OUTPATIENT)
Dept: FAMILY MEDICINE | Facility: CLINIC | Age: 52
End: 2022-05-20
Payer: COMMERCIAL

## 2022-05-20 DIAGNOSIS — I10 POORLY-CONTROLLED HYPERTENSION: ICD-10-CM

## 2022-05-20 NOTE — TELEPHONE ENCOUNTER
Reason for Call: FYI    Detailed comments:   Home Care Nurse visit and compliant regarding his pm doses of meds but not am's.  B/P 196/115 P 96  Oxygen 97 %  Denies having headache or light headedness. Denies going to for elevated B/P   County worker will go out next week for assessment PCA Services & Help with getting disability     Jessie Cruz [phone]    Call taken on 5/20/2022 at 2:53 PM by Chandrika Blair

## 2022-05-24 RX ORDER — METOPROLOL SUCCINATE 50 MG/1
TABLET, EXTENDED RELEASE ORAL
Qty: 30 TABLET | Refills: 0 | OUTPATIENT
Start: 2022-05-24

## 2022-05-25 ENCOUNTER — TELEPHONE (OUTPATIENT)
Dept: FAMILY MEDICINE | Facility: CLINIC | Age: 52
End: 2022-05-25
Payer: COMMERCIAL

## 2022-05-25 NOTE — TELEPHONE ENCOUNTER
Reason for Call:  Other    Detailed comments: Home Care Update: Nanette sebastian Cruz tried getting pt a wheelchair but pt declined getting one, so they will not be moving forward with that.    Phone Number can be reached at: Other phone number:  [phone]  [facility]    Best Time: anytime    Can we leave a detailed message on this number? YES    Call taken on 5/25/2022 at 1:35 PM by Kimberly Murcia

## 2022-05-27 ENCOUNTER — TELEPHONE (OUTPATIENT)
Dept: FAMILY MEDICINE | Facility: CLINIC | Age: 52
End: 2022-05-27
Payer: COMMERCIAL

## 2022-05-27 NOTE — TELEPHONE ENCOUNTER
Reason for Call:  Other    Detailed comments: [facility] calling to inform Abiola Joseph that they went to see pt this morning, but he did not answer the door or his phone so they were unable to meet with him to discuss his medication regime. He has been noncompliant with his meds in the past.     Phone Number can be reached at: Other phone number:  [phone]  [facility]    Best Time: anytime    Can we leave a detailed message on this number? YES    Call taken on 5/27/2022 at 1:13 PM by Kimberly Murcia

## 2022-06-01 ENCOUNTER — TELEPHONE (OUTPATIENT)
Dept: FAMILY MEDICINE | Facility: CLINIC | Age: 52
End: 2022-06-01
Payer: COMMERCIAL

## 2022-06-01 NOTE — TELEPHONE ENCOUNTER
"Reason for Call:  Other    Detailed comments: Home Care calling to update Abiola Joseph and care team that pt sent a message to the occupational therapist yesterday saying that he \"just wants to be left alone\", a well-fare check was called to the police and they visited his house but pt did not come to the door. Pt has not been available yesterday or today for a nurse visit, they have been unable to reach pt.    Phone Number Patient can be reached at: Other phone number:  [phone]  Jessie  [facility]    Best Time: anytime    Can we leave a detailed message on this number? YES    Call taken on 6/1/2022 at 10:46 AM by Kimberly Murcia    "

## 2022-06-01 NOTE — TELEPHONE ENCOUNTER
Spoke with Jessie who states pt has not answered door for OT and SN visit, has not replied to homecare calls or text messages until today. Replied wants to be left alone, suggesting homecare staff help some one else instead of him. No direct mention of self harm however RN feels feels pt in downward slope with depression sx. Ongoing medication non- compliance. [place] did welfare check yesterday, pt reportedly did not come to door. [place] contacted pt's brother who lives nearby, not sure of outcome of conversation. Son no longer living with pt. It is questionable if pt does have people staying at his house from time to time. [facility] did assessment last week, not sure of outcome.   No further SN or OT visits scheduled at this time due to pt not accepting visits. VA has not been filed.   RN discussed with RON Culver, who advises [facility] for update on assessment. VA should be filed by homecare or [facility]. If unable to do, VA should be filed by primary care.   LM for Anika [facility] to call clinic RN.  Also, attempt to call pt-  box full, unable to LM.   JAELYN Olivares RN

## 2022-06-01 NOTE — TELEPHONE ENCOUNTER
Noted. Please keep updated on homecare services and disability.    Abiola Joseph, DNP, APRN-CNP

## 2022-06-02 NOTE — TELEPHONE ENCOUNTER
"Called Aida at [facility]. Patient did sign consent for her to discuss his case with us.She Has seen patient twice now, once in her office and last week 05/25/22 at his home for a [place]. She reports he was \"fine on Wednesday.\" he allowed her to come into his home for the 1st time. She states he has a beautiful home he lives in rent free, it was dirty. The homecare nurse had been there 05/20/21 and set up patient's medications. Salty had not taken any of them. He told her he chooses when he wants to take them. He admitted to being depressed, told Parra he was miserable but antidepressants have not worked in the past. She educated him on taking them daily to get the full benefit. He does not qualify for case management or a waiver. He is having difficulty with disability follow through, she did give him the phone number for a disability . She also assisted him in getting his [employer] benefits renewed. He does qualify for PCA services 2.5 hours per day. He has not given her an answer yet whether or not he wants to pursue this. She states his basic needs are being met so nothing there indicating immediate vulnerable adult to be filed. She's planning to follow up with him today. He does not always answer his phone.   Aida states patient does respect Abiola Cabrera and wonders if a call from her encouraging PCA services to get some help in his home and for paperwork help... would be beneficial. Will route encounter to Abiola cabrera to review.   Poornima TAM RN    "

## 2022-06-02 NOTE — TELEPHONE ENCOUNTER
[facility] returning call to JAELYN Olivares.  Please call her 06/02 a.m. at [phone].      Marcella Arteaga on 6/2/2022 at 8:41 AM

## 2022-06-03 NOTE — TELEPHONE ENCOUNTER
Phone call to pt, answered on first try. Phone visit scheduled 06-07-22, 2 PM.   SAM Roman, Nancy, informed. She will need manager approval for visit. If approved, will try to be present for phone visit. [facility] she filed VA with [facility] yesterday due to med non- compliance resulting in uncontrolled HTN, unmanaged depression, danger to his own health.  RN contacted A[facility], informed of above.  States she spoke with pt again offering/ encouraging PCA services. States he needed more time to think about it. She will call him again next week.   Forwarded update to Abiola.  JAELYN Olivares RN

## 2022-06-03 NOTE — TELEPHONE ENCOUNTER
Can we call and see if patient will do a telephone visit with me just for a check in and discussion on medications, etc.     Thanks,  Abiola Joseph, DNP, APRN-CNP

## 2022-06-07 ENCOUNTER — VIRTUAL VISIT (OUTPATIENT)
Dept: FAMILY MEDICINE | Facility: CLINIC | Age: 52
End: 2022-06-07
Payer: COMMERCIAL

## 2022-06-07 DIAGNOSIS — Z53.9 ERRONEOUS ENCOUNTER--DISREGARD: Primary | ICD-10-CM

## 2022-06-07 NOTE — PROGRESS NOTES
Attempted to reach patient to set up virtual visit by phone x 3 and by [employer] message. mailbox is full. unable to leave a message  This encounter was opened in error. Please disregard.

## 2022-06-15 ENCOUNTER — TELEPHONE (OUTPATIENT)
Dept: FAMILY MEDICINE | Facility: CLINIC | Age: 52
End: 2022-06-15

## 2022-06-15 NOTE — TELEPHONE ENCOUNTER
Reason for Call:  FYI    Detailed comments:  Jessie with [facility] calling to give Abiola Joseph an FYI:  Salty has declined home care services for the third week in a row, so they are discontinuing care and want you to be aware.  Any questions, please call Jessie [phone].    Can we leave a detailed message on this number?   Yes.     Call taken on 6/15/2022 at 9:20 AM by Marcella Arteaga

## 2022-06-27 ENCOUNTER — TRANSFERRED RECORDS (OUTPATIENT)
Dept: HEALTH INFORMATION MANAGEMENT | Facility: CLINIC | Age: 52
End: 2022-06-27

## 2022-06-27 NOTE — TELEPHONE ENCOUNTER
Can we call and see if we can get patient set up for at minimum a telephone visit with me? Would like to see him in office if he would be willing.    Thanks,  Abiola Joseph, DNP, APRN-CNP

## 2022-06-30 NOTE — TELEPHONE ENCOUNTER
Attempted to call pt -Pt is in the Hospital at  [facility] -broken Hip per Daughter Bella.  She said she was going to discuss Home Care to except it as he will be needing this when he goes home from the Hospital.  Chandrika [facility] Sec

## 2022-08-11 ENCOUNTER — TELEPHONE (OUTPATIENT)
Dept: FAMILY MEDICINE | Facility: CLINIC | Age: 52
End: 2022-08-11

## 2022-08-11 DIAGNOSIS — I63.9 ACUTE CVA (CEREBROVASCULAR ACCIDENT) (H): Primary | ICD-10-CM

## 2022-08-11 DIAGNOSIS — Z86.73 HISTORY OF MULTIPLE CEREBROVASCULAR ACCIDENTS (CVAS): ICD-10-CM

## 2022-08-11 DIAGNOSIS — F32.A DEPRESSION, UNSPECIFIED DEPRESSION TYPE: ICD-10-CM

## 2022-08-11 NOTE — TELEPHONE ENCOUNTER
General Call    Contacts       Type Contact Phone/Fax    08/11/2022 01:28 PM CDT Phone (Incoming) Marilee [facility] (Home Care) [phone]        Reason for Call:  Referral request from Dr. Serrano denied due to max capacity. Marilee from [facility]    Jory Alonzo  Patient

## 2022-08-11 NOTE — TELEPHONE ENCOUNTER
Spoke with Veronica. [facility] intake, who does not have any information re: TCU discharge. [facility] referral has been reviewed and denied.  Pt informed, gave verbal permission to speak with brother Lock. [facility] pt was discharged from TCU [place], does not have details. Requested nurse call Eliot, son, has has medical info.  LM for son to call clinic nurse.   Brother plans to bring pt to appt with Abiola Monday 8/15 and will be assisting pt with needs.  JAELYN Olivares RN

## 2022-08-11 NOTE — TELEPHONE ENCOUNTER
Order/Referral Request    Who is requesting: Pt says he was in TCU and discharged home yesterday.  He says he had a stroke.   He lives alone and is in a wheelchair. He has apt with Abiola Joseph next Monday 8/15 but says he needs to get home care services started. He says he needs all the help they can give him.     Orders being requested: Home Care    When are orders needed by: ASAP    Has this been discussed with Provider: No  Upcoming apt 8/15/22    Could we send this information to you in [employer] or would you prefer to receive a phone call?:   Patient would prefer a phone call   Okay to leave a detailed message?: No at Home number on file [phone] (home)  He does not have voice mail. He says he keeps the phone near him.

## 2022-08-11 NOTE — TELEPHONE ENCOUNTER
Called Pt and let him know that Home care order referral was placed. Ot did not answer, left a message for Pt to call back.     Demetria Frias RN

## 2022-08-11 NOTE — TELEPHONE ENCOUNTER
"Spoke with Will, son, who states pt fell at home approx 5 wks ago resulting in rt hip fx with ORIF. Was discharged to [facility], [place] then \"in and out of [facility] due to stroke(s)\". Discharged from this TCU yesterday to home with assist of brother. No homecare referral upon discharge. Did get wheelchair,  hosp bed, ramp. Brother lives within close proximity to assist at needed. Has walkie- talkie in place, cell phone. Brother will also assist with med management.   Son requesting homecare for PT, OT- expects will be short term as pt making progress.   Aware of pt non- compliance with meds and POC in past. Feels pt accepting of help and services at this time.   Told will contact TCU for discharge info and contact another homecare agency (pt has had [facility] in past- discharged due to non- compliance).   Also advised pt sign PHI at upcoming appt listing son and brother as well as update emergency contact.  SAM BARRERA for [facility] medical records to call clinic DAVID Olivares RN    "

## 2022-08-12 NOTE — TELEPHONE ENCOUNTER
Spoke with [facility] - they will be faxing recent medical records.    Kimberly Murcia Patient

## 2022-08-12 NOTE — TELEPHONE ENCOUNTER
Spoke with [facility], who is unable to release records as pt has been discharged from TCU.   Clinic CSS will request [facility] records for continuity of care. Pt to sign CAROLYN for hosp and TCU records at [facility] with Abiola Mon 8/15.  RN contacted [facility] intake re: referral.   States will need to discuss with manager and will call RN back whether can/ will accept referral.  JAELYN Olivares RN

## 2022-08-12 NOTE — TELEPHONE ENCOUNTER
[facility] records placed in Abiola's yellow folder for review.  Have not heard back from [facility] yet.  Forwarded to Abiola as FYI prior to Mon 8/15 apprenetta.  JAELYN Olivares RN

## 2022-08-15 ENCOUNTER — OFFICE VISIT (OUTPATIENT)
Dept: FAMILY MEDICINE | Facility: CLINIC | Age: 52
End: 2022-08-15
Payer: COMMERCIAL

## 2022-08-15 ENCOUNTER — TELEPHONE (OUTPATIENT)
Dept: FAMILY MEDICINE | Facility: CLINIC | Age: 52
End: 2022-08-15

## 2022-08-15 VITALS
RESPIRATION RATE: 12 BRPM | HEART RATE: 64 BPM | TEMPERATURE: 98.5 F | OXYGEN SATURATION: 96 % | DIASTOLIC BLOOD PRESSURE: 90 MMHG | SYSTOLIC BLOOD PRESSURE: 152 MMHG

## 2022-08-15 DIAGNOSIS — I10 POORLY-CONTROLLED HYPERTENSION: ICD-10-CM

## 2022-08-15 DIAGNOSIS — I63.9 ACUTE CVA (CEREBROVASCULAR ACCIDENT) (H): Primary | ICD-10-CM

## 2022-08-15 DIAGNOSIS — F32.A DEPRESSION, UNSPECIFIED DEPRESSION TYPE: ICD-10-CM

## 2022-08-15 LAB
ALBUMIN SERPL-MCNC: 2.9 G/DL (ref 3.4–5)
ALP SERPL-CCNC: 94 U/L (ref 40–150)
ALT SERPL W P-5'-P-CCNC: 15 U/L (ref 0–70)
ANION GAP SERPL CALCULATED.3IONS-SCNC: 10 MMOL/L (ref 3–14)
AST SERPL W P-5'-P-CCNC: 24 U/L (ref 0–45)
BILIRUB SERPL-MCNC: 0.3 MG/DL (ref 0.2–1.3)
BUN SERPL-MCNC: 29 MG/DL (ref 7–30)
CALCIUM SERPL-MCNC: 9 MG/DL (ref 8.5–10.1)
CHLORIDE BLD-SCNC: 109 MMOL/L (ref 94–109)
CO2 SERPL-SCNC: 20 MMOL/L (ref 20–32)
CREAT SERPL-MCNC: 1.78 MG/DL (ref 0.66–1.25)
ERYTHROCYTE [DISTWIDTH] IN BLOOD BY AUTOMATED COUNT: 13.5 % (ref 10–15)
GFR SERPL CREATININE-BSD FRML MDRD: 45 ML/MIN/1.73M2
GLUCOSE BLD-MCNC: 99 MG/DL (ref 70–99)
HCT VFR BLD AUTO: 36.3 % (ref 40–53)
HGB BLD-MCNC: 12 G/DL (ref 13.3–17.7)
MCH RBC QN AUTO: 30.9 PG (ref 26.5–33)
MCHC RBC AUTO-ENTMCNC: 33.1 G/DL (ref 31.5–36.5)
MCV RBC AUTO: 94 FL (ref 78–100)
PLATELET # BLD AUTO: 260 10E3/UL (ref 150–450)
POTASSIUM BLD-SCNC: 5.4 MMOL/L (ref 3.4–5.3)
PROT SERPL-MCNC: 7 G/DL (ref 6.8–8.8)
RBC # BLD AUTO: 3.88 10E6/UL (ref 4.4–5.9)
SODIUM SERPL-SCNC: 139 MMOL/L (ref 133–144)
WBC # BLD AUTO: 10.8 10E3/UL (ref 4–11)

## 2022-08-15 PROCEDURE — 80053 COMPREHEN METABOLIC PANEL: CPT | Performed by: NURSE PRACTITIONER

## 2022-08-15 PROCEDURE — 99215 OFFICE O/P EST HI 40 MIN: CPT | Performed by: NURSE PRACTITIONER

## 2022-08-15 PROCEDURE — 36416 COLLJ CAPILLARY BLOOD SPEC: CPT | Performed by: NURSE PRACTITIONER

## 2022-08-15 PROCEDURE — 85027 COMPLETE CBC AUTOMATED: CPT | Performed by: NURSE PRACTITIONER

## 2022-08-15 RX ORDER — CITALOPRAM HYDROBROMIDE 10 MG/1
10 TABLET ORAL DAILY
COMMUNITY
Start: 2022-08-05 | End: 2022-08-15

## 2022-08-15 RX ORDER — CITALOPRAM HYDROBROMIDE 10 MG/1
5 TABLET ORAL DAILY
Qty: 1 TABLET | Refills: 0 | COMMUNITY
Start: 2022-08-15 | End: 2022-08-24

## 2022-08-15 RX ORDER — QUETIAPINE FUMARATE 25 MG/1
25 TABLET, FILM COATED ORAL AT BEDTIME
COMMUNITY
Start: 2022-08-02 | End: 2022-08-24

## 2022-08-15 ASSESSMENT — PATIENT HEALTH QUESTIONNAIRE - PHQ9
SUM OF ALL RESPONSES TO PHQ QUESTIONS 1-9: 25
SUM OF ALL RESPONSES TO PHQ QUESTIONS 1-9: 25
10. IF YOU CHECKED OFF ANY PROBLEMS, HOW DIFFICULT HAVE THESE PROBLEMS MADE IT FOR YOU TO DO YOUR WORK, TAKE CARE OF THINGS AT HOME, OR GET ALONG WITH OTHER PEOPLE: EXTREMELY DIFFICULT

## 2022-08-15 ASSESSMENT — PAIN SCALES - GENERAL: PAINLEVEL: NO PAIN (0)

## 2022-08-15 NOTE — TELEPHONE ENCOUNTER
Ashvin, brother, calling with current citalopram dose in F/U to OV today. States currently taking citalopram 10 mg 0.5 tab (5 mg daily). Med list updated.   Forwarded to Abiola as GIORGIO Olivares RN

## 2022-08-15 NOTE — NURSING NOTE
"Chief Complaint   Patient presents with     Hospital F/U     TCU follow up       BP (!) 144/84   Pulse 64   Temp 98.5  F (36.9  C) (Tympanic)   Resp 12   SpO2 96%  Estimated body mass index is 26.11 kg/m  as calculated from the following:    Height as of 2/2/22: 1.778 m (5' 10\").    Weight as of 4/6/22: 82.6 kg (182 lb).  Patient presents to the clinic using Wheel Chair      Health Maintenance that is potentially due pending provider review:    Health Maintenance Due   Topic Date Due     PREVENTIVE CARE VISIT  Never done     MICROALBUMIN  Never done     ADVANCE CARE PLANNING  Never done     URINALYSIS  Never done     COLORECTAL CANCER SCREENING  Never done     HIV SCREENING  Never done     HEPATITIS C SCREENING  Never done     ZOSTER IMMUNIZATION (1 of 2) Never done        Pended.        "

## 2022-08-15 NOTE — TELEPHONE ENCOUNTER
RN contacted [facility] as have not heard back if will accept referral. Spoke with pablo Wu, who consulted with manager. Referral denied due to past non- compliance. Explained pt does have more support now from brother and son which hopefully promote compliance. Manager still declines referral.  Forwarded to [facility] coord referral for assist with other homecare agency options? ([facility] unable to accept).   JAELYN Olivares RN

## 2022-08-15 NOTE — PATIENT INSTRUCTIONS
Acute CVA (cerebrovascular accident) (H)  Acute, recurrent CVA hospitalized for just over a month and recently discharged from transitional care unit.  Has right-sided weakness, has not had home caring set up as of yet due to communication problems with family due to consent.  Patient will need PT/OT, nurse and  at home.  Is currently not walking, however can bear weight.  Currently brother is assisting at home and son is involved in care as well.  Patient is taking all medications as prescribed.  We will send consent to communicate to home caring to start the process to set up.  We will check labs today and notify patient/family of results.  Recommend follow-up either virtual or in person in 1 month.  - CBC with platelets; Future    Poorly-controlled hypertension  Chronic, previously uncontrolled resulting in CVA as above.  Blood pressure still above goal in office today, however much improved from previous.  After reviewing medications, some discrepancies noted.  Advised brother to call the clinic once home to review all medications with the nurse and/or staff.  We will determine any further changes at that time.  - Comprehensive metabolic panel (BMP + Alb, Alk Phos, ALT, AST, Total. Bili, TP); Future    Depression, unspecified depression type  Chronic, stable.  Still having some depressive symptoms, however patient feels the dosage of medication is okay.  We will continue current citalopram dosage without any changes.

## 2022-08-15 NOTE — PROGRESS NOTES
Assessment & Plan     Acute CVA (cerebrovascular accident) (H)  Acute, recurrent CVA hospitalized for just over a month and recently discharged from transitional care unit.  Has right-sided weakness, has not had home caring set up as of yet due to communication problems with family due to consent.  Patient will need PT/OT, nurse and  at home.  Is currently not walking, however can bear weight.  Currently brother is assisting at home and son is involved in care as well.  Patient is taking all medications as prescribed.  We will send consent to communicate to home caring to start the process to set up.  We will check labs today and notify patient/family of results.  Recommend follow-up either virtual or in person in 1 month.  - CBC with platelets; Future    Poorly-controlled hypertension  Chronic, previously uncontrolled resulting in CVA as above.  Blood pressure still above goal in office today, however much improved from previous.  After reviewing medications, some discrepancies noted.  Advised brother to call the clinic once home to review all medications with the nurse and/or staff.  We will determine any further changes at that time.  - Comprehensive metabolic panel (BMP + Alb, Alk Phos, ALT, AST, Total. Bili, TP); Future    Depression, unspecified depression type  Chronic, stable.  Still having some depressive symptoms, however patient feels the dosage of medication is okay.  We will continue current citalopram dosage without any changes.      Review of prior external note(s) from - Outside records from [facility]  50 minutes spent on the date of the encounter doing chart review, history and exam, documentation and further activities per the note       Depression Screening Follow Up    PHQ 8/15/2022   PHQ-9 Total Score 25   Q9: Thoughts of better off dead/self-harm past 2 weeks Several days   F/U: Thoughts of suicide or self-harm Yes   F/U: Self harm-plan No    F/U: Self-harm action No   F/U: Safety concerns Yes     Last PHQ-9 8/15/2022   1.  Little interest or pleasure in doing things 3   2.  Feeling down, depressed, or hopeless 3   3.  Trouble falling or staying asleep, or sleeping too much 3   4.  Feeling tired or having little energy 3   5.  Poor appetite or overeating 3   6.  Feeling bad about yourself 3   7.  Trouble concentrating 3   8.  Moving slowly or restless 3   Q9: Thoughts of better off dead/self-harm past 2 weeks 1   PHQ-9 Total Score 25   Difficulty at work, home, or with people -   In the past two weeks have you had thoughts of suicide or self harm? Yes   Do you have concerns about your personal safety or the safety of others? Yes   In the past 2 weeks have you thought about a plan or had intention to harm yourself? No   In the past 2 weeks have you acted on these thoughts in any way? No             See Patient Instructions    Return in about 1 month (around 9/15/2022) for Recheck.    Abiola Joseph, RASHEL, APRN-CNP   [facility]    Lillian Pond is a 51 year old accompanied by his brother, presenting for the following health issues:  Hospital F/U (TCU follow up/)      HPI       Hospital Follow-up Visit:    Hospital/Nursing Home/ Rehab Facility: [facility]  Date of Admission: 6/26/22  Date of Discharge: 8/10/22  Reason(s) for Admission: Femur fracture and stroke    Was your hospitalization related to COVID-19? No   Problems taking medications regularly:  None  Medication changes since discharge: None  Problems adhering to non-medication therapy:  None    Summary of hospitalization:  See outside records, reviewed and scanned  Diagnostic Tests/Treatments reviewed.  Follow up needed: Neurology, Home Care  Other Healthcare Providers Involved in Patient s Care:         Homecare, Care Coordination, Physical Therapy and Neurology  Update since discharge: improved.         Post Medication Reconciliation  Status:        Brother lives very close and helps out daily   Feels is progressing quite well   Can stand up against a counter and can side step and free stand with help - not walking yet   Brother is setting medications up and administering   Son Eliot is in the [facility]    Plan of care communicated with patient and family               Review of Systems   Constitutional, HEENT, cardiovascular, pulmonary, gi and gu systems are negative, except as otherwise noted.      Objective    BP (!) 152/90   Pulse 64   Temp 98.5  F (36.9  C) (Tympanic)   Resp 12   SpO2 96%   There is no height or weight on file to calculate BMI.  Physical Exam   GENERAL APPEARANCE: healthy, alert, no distress and fatigued  NECK: no adenopathy, no asymmetry, masses, or scars and thyroid normal to palpation  RESP: lungs clear to auscultation - no rales, rhonchi or wheezes  CV: regular rates and rhythm, normal S1 S2, no S3 or S4 and no murmur, click or rub  ABDOMEN: soft, nontender, without hepatosplenomegaly or masses and bowel sounds normal  MS: extremities normal- no gross deformities noted and peripheral pulses normal  SKIN: no suspicious lesions or rashes  NEURO: mentation intact, weakness of right lower extremity and right upper extremity  PSYCH: mentation appears normal, affect flat and crying    Results for orders placed or performed in visit on 08/15/22   Comprehensive metabolic panel (BMP + Alb, Alk Phos, ALT, AST, Total. Bili, TP)     Status: Abnormal   Result Value Ref Range    Sodium 139 133 - 144 mmol/L    Potassium 5.4 (H) 3.4 - 5.3 mmol/L    Chloride 109 94 - 109 mmol/L    Carbon Dioxide (CO2) 20 20 - 32 mmol/L    Anion Gap 10 3 - 14 mmol/L    Urea Nitrogen 29 7 - 30 mg/dL    Creatinine 1.78 (H) 0.66 - 1.25 mg/dL    Calcium 9.0 8.5 - 10.1 mg/dL    Glucose 99 70 - 99 mg/dL    Alkaline Phosphatase 94 40 - 150 U/L    AST 24 0 - 45 U/L    ALT 15 0 - 70 U/L    Protein Total 7.0 6.8 - 8.8 g/dL    Albumin 2.9 (L) 3.4 - 5.0 g/dL     Bilirubin Total 0.3 0.2 - 1.3 mg/dL    GFR Estimate 45 (L) >60 mL/min/1.73m2   CBC with platelets     Status: Abnormal   Result Value Ref Range    WBC Count 10.8 4.0 - 11.0 10e3/uL    RBC Count 3.88 (L) 4.40 - 5.90 10e6/uL    Hemoglobin 12.0 (L) 13.3 - 17.7 g/dL    Hematocrit 36.3 (L) 40.0 - 53.0 %    MCV 94 78 - 100 fL    MCH 30.9 26.5 - 33.0 pg    MCHC 33.1 31.5 - 36.5 g/dL    RDW 13.5 10.0 - 15.0 %    Platelet Count 260 150 - 450 10e3/uL                 .  ..   Chart documentation with Dragon Voice recognition Software. Although reviewed after completion, some words and grammatical errors may remain.

## 2022-08-16 NOTE — TELEPHONE ENCOUNTER
Can we find out also if he is taking the full 10 mg dose of Amlodipine please.    Thanks,  Abiola Joseph, DNP, APRN-CNP

## 2022-08-17 ENCOUNTER — PATIENT OUTREACH (OUTPATIENT)
Dept: CARE COORDINATION | Facility: CLINIC | Age: 52
End: 2022-08-17

## 2022-08-17 NOTE — PROGRESS NOTES
[facility] Care Resource Center    Background: CCR team member received notification from Ambulatory Care Coordination Clinic team requesting assistance in finding a home care agency to accept referral for skilled home care due to [facility] declining referral.     Home Care Referral placed for the following services:      Skilled Nursing Complex aftercare    Additional services needed: Occupational Therapy  Home Health Aide ADLs       RN called the following home care agencies:    - [facility] (ph: [phone]), spoke with Nicky in intake - at capacity for MA & PMAP insurances at this time.    - [facility] (ph: [phone]), left message with intake department with referral information and requested return call with availability.      Received return call from Dayanna with [facility] intake - they are not taking any new referrals at this time, and do not take pt's insurance.     - [facility] (ph: [phone]), spoke with Lesly in intake - no availability in patient's area.    - [facility] (ph: [phone]), spoke with Ubaldo in intake - outside service area.    - [facility] (ph: [phone]), left message for Namita in intake with referral information and requested return call with availability.    ADDENDUM 8/18/22:  RN received return voicemail message from Namita with [facility] ([facility] intake (ph: [phone]) on 8/17/22 at 3:51pm, informing they do not have any availability in patient's area.  Please call with any questions.      - [facility] (ph: [phone]), spoke with Hazel in intake - referred to their [place] office as they provide services in pt's location, ph: [phone].  Called [place] office and left a message for intake department with referral information & requested return call with availability.    ADDENDUM 8/18/22:  RN received return voicemail  message from Rani nurse with [facility] intake (ph: [phone]) on 8/17/22 at 3:25pm, informing they do not accept pt's insurance and do not have HHA & OT in pt's area.  Please call with any questions.      RN will await return calls from [facility] and [facility].      Leisa Yun RN  Connected Care Resource Center  [facility] - Ambulatory Care Management        *Connected Care Resource Team does NOT follow patient ongoing. Referrals are identified based on internal discharge reports and the outreach is to ensure patient has an understanding of their discharge instructions.

## 2022-08-17 NOTE — PROGRESS NOTES
Clinic Care Coordination RN :    PCP referral   Needs help with homecare as [facility] is declining    Sent request to the home care  group to do the home care search     [facility]   Emerald Huynh RN, Care Coordinator   [facility]'s   E-mail [email or web address]   [phone]

## 2022-08-18 ENCOUNTER — TELEPHONE (OUTPATIENT)
Dept: FAMILY MEDICINE | Facility: CLINIC | Age: 52
End: 2022-08-18

## 2022-08-18 DIAGNOSIS — I10 POORLY-CONTROLLED HYPERTENSION: ICD-10-CM

## 2022-08-18 NOTE — TELEPHONE ENCOUNTER
Pt has had elevated BP 2-3 days 155, 178  For systolic numbers, he denies changes in medications, no symptoms.  Pt was called, medications gone over with pt and his brother Ashvin. Pt has 5 different blister packs of medications that he brought home from [facility].   Amlodipine 5 mg  Atorvastatin 40 mg  Citalopram 10 mg, take 1/2 tab  Metoprolol ER 50 mg  Quetapine 25 mg    Pt is taking no other medications. Our medication list has plavix, ASA 81 mg, clonidine, lisinopril. Pt does not have any of those medications at home, should he be on those?   Bella Campbell RN

## 2022-08-18 NOTE — TELEPHONE ENCOUNTER
B/P Call      Reason for Call:  Elevated B/P     What are your questions or concerns:  Today B/P   Pt said he feels fine but concerned these are elevated.    174/ 104 Rt Arm   170/95 Lft Arm     Date of last appointment with provider: 8/15/22    Could we send this information to you in [employer] or would you prefer to receive a phone call?:   Patient would prefer a phone call   Okay to leave a detailed message?: Yes at Home number on file [phone] (home)

## 2022-08-19 DIAGNOSIS — E87.5 HYPERKALEMIA: Primary | ICD-10-CM

## 2022-08-19 DIAGNOSIS — D64.9 LOW HEMOGLOBIN: ICD-10-CM

## 2022-08-19 NOTE — PROGRESS NOTES
RN called the following additional home care agencies in hope of securing home care prior to the weekend:    - [facility] (ph: [phone]), left message for Jessica VARGAS CM in intake, with referral information and requested return call with availability.    - [facility] (ph: [phone]), spoke with Lesly in intake - outside service area    - [facility] (ph: [phone]), spoke with Norma in intake - closed for nursing, only open for PT, OT & SLP at this time.    - [facility] (ph: [phone]), left message for intake with referral information and requested return call with availability.    ADDENDUM 8/19/22 at 2:37pm:  Received return call from Maegan with [facility] intake (ph: [phone]) - they do not take pt's insurance.    - [facility] (ph: [phone]), left message for intake with referral information & requested return call with availability.      RN will wait to hear back from [facility], [facility] and [facility].      Leisa Yun, RN  Connected Care Resource Center  [facility] - Ambulatory Care Management

## 2022-08-22 DIAGNOSIS — Z86.73 HISTORY OF MULTIPLE STROKES: Primary | ICD-10-CM

## 2022-08-22 DIAGNOSIS — I63.9 ACUTE CVA (CEREBROVASCULAR ACCIDENT) (H): ICD-10-CM

## 2022-08-23 ENCOUNTER — TELEPHONE (OUTPATIENT)
Dept: FAMILY MEDICINE | Facility: CLINIC | Age: 52
End: 2022-08-23

## 2022-08-23 DIAGNOSIS — Z86.73 HISTORY OF MULTIPLE CEREBROVASCULAR ACCIDENTS (CVAS): ICD-10-CM

## 2022-08-23 DIAGNOSIS — I63.9 ACUTE CVA (CEREBROVASCULAR ACCIDENT) (H): Primary | ICD-10-CM

## 2022-08-23 NOTE — TELEPHONE ENCOUNTER
Reason for Call:  Other referral     Detailed comments: Looking to get a referral for PT to strengthen some muscles, patient requested this per nurse  Rocío. Patient is mainly wheelchair bound, not able to walk well, or have strength for day to day tasks looking to get some help and supports through PT     Phone Number Patient can be reached at: Other phone number: Rocío Nurse  [phone]    Best Time: anytime    Can we leave a detailed message on this number? YES    Call taken on 8/23/2022 at 11:47 AM by Kay Peacock

## 2022-08-24 ENCOUNTER — TELEPHONE (OUTPATIENT)
Dept: FAMILY MEDICINE | Facility: CLINIC | Age: 52
End: 2022-08-24

## 2022-08-24 DIAGNOSIS — I10 POORLY-CONTROLLED HYPERTENSION: ICD-10-CM

## 2022-08-24 DIAGNOSIS — N18.32 STAGE 3B CHRONIC KIDNEY DISEASE (H): ICD-10-CM

## 2022-08-24 DIAGNOSIS — F32.A DEPRESSION, UNSPECIFIED DEPRESSION TYPE: Primary | ICD-10-CM

## 2022-08-24 DIAGNOSIS — Z86.73 HISTORY OF MULTIPLE STROKES: ICD-10-CM

## 2022-08-24 DIAGNOSIS — I63.9 ACUTE CVA (CEREBROVASCULAR ACCIDENT) (H): ICD-10-CM

## 2022-08-24 DIAGNOSIS — Z86.73 HISTORY OF MULTIPLE STROKES: Primary | ICD-10-CM

## 2022-08-24 RX ORDER — CITALOPRAM HYDROBROMIDE 10 MG/1
5 TABLET ORAL DAILY
Qty: 45 TABLET | Refills: 1 | Status: SHIPPED | OUTPATIENT
Start: 2022-08-24 | End: 2022-11-01

## 2022-08-24 RX ORDER — METOPROLOL SUCCINATE 50 MG/1
50 TABLET, EXTENDED RELEASE ORAL DAILY
Qty: 90 TABLET | Refills: 1 | Status: SHIPPED | OUTPATIENT
Start: 2022-08-24 | End: 2023-01-01

## 2022-08-24 RX ORDER — ATORVASTATIN CALCIUM 40 MG/1
40 TABLET, FILM COATED ORAL EVERY EVENING
Qty: 90 TABLET | Refills: 3 | OUTPATIENT
Start: 2022-08-24

## 2022-08-24 RX ORDER — AMLODIPINE BESYLATE 10 MG/1
10 TABLET ORAL DAILY
Qty: 90 TABLET | Refills: 3 | OUTPATIENT
Start: 2022-08-24

## 2022-08-24 RX ORDER — QUETIAPINE FUMARATE 25 MG/1
25 TABLET, FILM COATED ORAL AT BEDTIME
Qty: 90 TABLET | Refills: 1 | Status: SHIPPED | OUTPATIENT
Start: 2022-08-24 | End: 2022-11-01

## 2022-08-24 NOTE — TELEPHONE ENCOUNTER
Please advise patient/brother if truly taking amlodipine 5 mg, would recommend increasing to 10 mg daily.  I will send a new prescription through if they need.  Then, would advise having home care to monitor blood pressures over the next 2 weeks and report to provider if still elevated.    Thanks,  Abiola Joseph, DNP, APRN-CNP

## 2022-08-24 NOTE — TELEPHONE ENCOUNTER
Order/Referral Request    Who is requesting: Rocío - Home Care    Orders being requested: Ot referral was declined to do max capacity     Needing referral to a different Facility. Rocío was hoping to brainstorm with someone about different Homecare agencies that could help him. Original referral request 8/23/22    Could we send this information to you in [employer] or would you prefer to receive a phone call?:  Analisa [phone]

## 2022-08-24 NOTE — PROGRESS NOTES
RN has not heard back from [facility] or [facility].    RN called [facility] (ph: [phone]) to follow up on message left on 8/19/22 - left a second message with referral information and requested return call.         Leisa Yun RN  Connected Care Resource Center  [facility] - Ambulatory Care Management

## 2022-08-24 NOTE — TELEPHONE ENCOUNTER
Please advise a home care referral for PT and OT had been placed on the 22nd.  Please let me know if there is anything else they need I may be check in with home care for status on this.    Thanks,  Abiola Joseph, DNP, APRN-CNP

## 2022-08-24 NOTE — TELEPHONE ENCOUNTER
Please notify patient's brother prescriptions have been sent through to the pharmacy.    Thanks,  Abiola Joseph, DNP, APRN-CNP

## 2022-08-24 NOTE — TELEPHONE ENCOUNTER
Patient will be out of Citalopram on 8/25/22.     Please Call Patients Brother, Ashvin [phone], when meds are sent to Pharmacy.

## 2022-08-24 NOTE — TELEPHONE ENCOUNTER
left detailed message for Rocío with home care to call if we need to do anything else for this referral

## 2022-08-25 ENCOUNTER — PATIENT OUTREACH (OUTPATIENT)
Dept: CARE COORDINATION | Facility: CLINIC | Age: 52
End: 2022-08-25

## 2022-08-25 NOTE — PROGRESS NOTES
RN received return call from Jessica VARGAS CM with [facility] intake (ph: [phone]) today at 3:58pm - unable to accept referral, outside service area.        CCRC RN has been unable to secure home care for patient, and now a new home care referral has been placed by PCP on 8/23/22 by request of pt's nurse  (see phone note dated 8/23/22 for further details).      CCRC RN consulted with Emerald Huynh RN Care Coordinator and handed off to CCC for follow up.  Encounter closed.       Leisa Yun, RN  Connected Care Resource Center  [facility] - Ambulatory Care Management

## 2022-08-25 NOTE — PROGRESS NOTES
Clinic Care Coordination Contact    New CC order received to help find alt home care agencies.     Care coordination home care search  program is already working with pt. See 8/17/22 encounter.     MARSHA San   Social Work Primary Care Clinic Care Coordinator   [facility]

## 2022-08-26 ENCOUNTER — TELEPHONE (OUTPATIENT)
Dept: FAMILY MEDICINE | Facility: CLINIC | Age: 52
End: 2022-08-26

## 2022-08-26 RX ORDER — AMLODIPINE BESYLATE 10 MG/1
10 TABLET ORAL DAILY
Qty: 90 TABLET | Refills: 1 | Status: SHIPPED | OUTPATIENT
Start: 2022-08-26 | End: 2023-01-01

## 2022-08-26 NOTE — TELEPHONE ENCOUNTER
Spoke with Ashvin, brother, who confirms taking Amlodipine 5 mg daily (has qty 5 of 10 mg one half tab left in blister pack from TCU.  This AM /104 before taking medications. BP previous days 144/99, 141/104, 177, 104, 158/94.   Denies pt having sx elevated BP.  Informed Abiola advises to increase amlodipine to 10mg tab one daily. New Rx sent to pharm. Can finish out 5 mg by taking 2 one- half tabs daily.   Advise to call clinic with BP readings early next week, will then have Abiola review.  Also, states pharm unable to fill citalopram, was told too soon. RN contact  pharm who states Rx set to fill tomorrow 8/27. Pharmacist spoke with brothnoah yesterday, was told can purchase small qty for $5 to bridge. Ashvin also informed of this and will go to pharm today to .  Forwarded to Abiola as FYI. Current med list incorrect according to med review with .   Also- see CC note- unable to find homecare agency.  JAELYN Olivares, RN

## 2022-08-26 NOTE — TELEPHONE ENCOUNTER
Left message for pt and Ashvin, brother to call clinic RN.  Spoke with Will, son, who is currently out of state. He will get message to Ashvin to call care team preferably with med bottles available for review again, particularly amlodipine.  JAELYN Olivares RN

## 2022-08-26 NOTE — TELEPHONE ENCOUNTER
LM on secure VM with verbal approval for orders as requested with start of care date 08-29-22.  LM also informing amlodipine increased to 10 mg daily effective today due to persistent elevated BP. Ashvin, brother, instructed to call care team early next week with BP readings. Requested SN to also review BP's and call care team with BP reading.  Forwarded to Abiola as GIORGIO Olivares RN

## 2022-08-26 NOTE — PROGRESS NOTES
Home care search program has been unable to secure a home care agency. They contacted:     -[facility]  -[facility]   -[facility]  -[facility]   -[facility]  -[facility]  -[facility]  -[facility]  -[facility]  -[facility]  -[facility]    Today:  -[facility] (responded that they do not take insurance)    Their health plan is not a prominent one in our market area and may be the issue here, as well as industry wide staffing shortages.     Care coordination left message for Ab Alford MA nurse  to see if they have any other options for patient that take his health plan.     See encounter 8/17/22 for more details if needed.     Thank you.    Care coordination team

## 2022-08-26 NOTE — TELEPHONE ENCOUNTER
Reason for Call:  Home Health Care  Crystal  with [facility] called regarding (reason for call): Verbal Orders    Orders are needed for this patient.     Need new orders for start of care 8/29/22    PT: PT eval and treat    OT: OT eval and treat    Skilled Nursing: Skilled Nursing and Home Health Aide      Pt Provider: Abiola Joseph    Phone Number Homecare Nurse can be reached at: [phone]    Can we leave a detailed message on this number? YES        Call taken on 8/26/2022 at 2:40 PM by Ioana Enriquez

## 2022-08-27 ENCOUNTER — HEALTH MAINTENANCE LETTER (OUTPATIENT)
Age: 52
End: 2022-08-27

## 2022-08-29 ENCOUNTER — TELEPHONE (OUTPATIENT)
Dept: FAMILY MEDICINE | Facility: CLINIC | Age: 52
End: 2022-08-29

## 2022-08-29 DIAGNOSIS — F32.A DEPRESSION, UNSPECIFIED DEPRESSION TYPE: Primary | ICD-10-CM

## 2022-08-29 DIAGNOSIS — F15.10 DRUG ABUSE, AMPHETAMINE TYPE (H): ICD-10-CM

## 2022-08-29 NOTE — TELEPHONE ENCOUNTER
Order/Referral Request    Who is requesting: Pt's son - Will    Orders being requested: Mental Health referral    Reason service is needed/diagnosis: Mental health issues, making sure pt's antidepressants are working    When are orders needed by: as soon as possible    Has this been discussed with Provider: Yes    Does patient have a preference on a Group/Provider/Facility? N/A    Does patient have an appointment scheduled?: No    Where to send orders: N/A     Comments: Pt's son is also wondering on the in-home physical therapy referral that Abiola put in - has some questions on this as there seems to be some confusion on the referral. Would like a call back from care team to discuss.    Could we send this information to you in [facility] or would you prefer to receive a phone call?:   Patient would prefer a phone call   Okay to leave a detailed message?: Yes at Home number on file [phone]

## 2022-08-29 NOTE — TELEPHONE ENCOUNTER
"1) Spoke with son who was not aware Celexa refilled 08-24-22, brother Ashvin was informed and planning to  med Fri/ Sat. Son feels pt would benefit from mental health referral due to hx depression, states pt had a \"wild childhood\" and continues to focus on.     2) Informed [facility] referral for SN, PT, OT, HHA with start of care date planned for today.   RN contacted [facility] to confirm, states SOC now planned for tomorrow 8/30 per pt request.   Requested homecare call with update after initial assessment(s) on BP, med compliance/ understanding, mobility and any other info.    Forwarded to Abiola for review. Referral pended.  Closest mental health option to pt would be [facility].  Please advise when you want to see pt back in clinic and will coordinate F/U visit.  JAELYN Olivares RN      " No

## 2022-08-29 NOTE — PROGRESS NOTES
Per separate encounter dated 8/26/22, [facility] now accepting pt.     Closing care coordination program. Pt has RN CM with health plan - [employer] #[phone]. Added info to care team.     Left message for SAM Alford CM with update.     MARSHA San   Social Work Primary Care Clinic Care Coordinator   [facility]

## 2022-08-29 NOTE — TELEPHONE ENCOUNTER
Mental health referral placed.  Follow-up was advised 1 month after last visit for recheck.    Thanks,  Abiola Joseph, DNP, APRN-CNP

## 2022-08-30 ENCOUNTER — MEDICAL CORRESPONDENCE (OUTPATIENT)
Dept: HEALTH INFORMATION MANAGEMENT | Facility: CLINIC | Age: 52
End: 2022-08-30

## 2022-09-02 ENCOUNTER — TELEPHONE (OUTPATIENT)
Dept: FAMILY MEDICINE | Facility: CLINIC | Age: 52
End: 2022-09-02

## 2022-09-02 NOTE — PROGRESS NOTES
Clinic Care Coordination Contact    AUDELIA TAYLOR received message from Ab Alford MA RN case manager. Wanted to check if [facility] took pt as pt said they didn't call.     AUDELIA CC returned call and clarified [facility] took pt.     MARSHA San   Social Work Primary Care Clinic Care Coordinator   [facility]

## 2022-09-02 NOTE — TELEPHONE ENCOUNTER
Called and gave VO for OT working on ADL and in-home safety 1 time a week fo r4 weeks and 2 PRN visits.     Demetria Frias RN

## 2022-09-02 NOTE — TELEPHONE ENCOUNTER
General Call      Reason for Call:  Garrett from [facility] calling to report high blood pressure from pt this morning - 163/91. Pt stated he had not taken his medication yet.       Okay to leave a detailed message?: Yes at Other phone number:  [phone]  [facility]

## 2022-09-02 NOTE — TELEPHONE ENCOUNTER
Called Pia, and Let her know that Abiola Joseph would like an update after Pt has taken her BP medication. Pia stated she will do this.     Demetria Frias RN

## 2022-09-02 NOTE — TELEPHONE ENCOUNTER
Please advise home care to update provider with blood pressure after patient takes blood pressure medications.    Thanks,  Abiola Joseph, RASHEL, APRN-CNP

## 2022-09-02 NOTE — TELEPHONE ENCOUNTER
Order/Referral Request    Who is requesting: [facility]    Orders being requested: OT working on ADL and in-home safety, 1 time a week for 4 weeks, and 2 PRN visits.    Okay to leave a detailed message?: Yes at Other phone number:  [phone]  [facility]

## 2022-09-07 ENCOUNTER — TELEPHONE (OUTPATIENT)
Dept: FAMILY MEDICINE | Facility: CLINIC | Age: 52
End: 2022-09-07

## 2022-09-07 NOTE — TELEPHONE ENCOUNTER
FYI - Status Update    Who is Calling: Pia- Occupational Therapist     Update: Pia sent patient to ED for dizziness and elevated BP    Does caller want a call/response back: No

## 2022-09-10 NOTE — TELEPHONE ENCOUNTER
Patient Quality Outreach      Summary:    Patient has the following on his problem list/HM:   Depression / Dysthymia review    6 Month Remission: 4-8 month window range: unknown  12 Month Remission: 10-14 month window range: unknown       PHQ-9 SCORE 8/15/2019 5/21/2021   PHQ-9 Total Score MyChart - 24 (Severe depression)   PHQ-9 Total Score 10 24       If PHQ-9 recheck is 5 or more, route to provider for next steps.    Hypertension   Last three blood pressure readings:  BP Readings from Last 3 Encounters:   08/29/19 (!) 170/110   08/15/19 (!) 196/104   01/22/08 130/90     Blood pressure: Failed    HTN Guidelines:  ? 139/89     Patient is due/failing the following:   BP check    Type of outreach:    Phone, spoke to patient/parent.  quit all medication, patient wants a nutritionist and PT to get stronger  Patient is only taking small dose asprin  Questions for provider review:    None      A telephone visit was scheduled, patient will not have BP checked because he does not want to know what his BP is                                                                                                                               Marylou PARTIDA CMA       Chart routed to Care Team.        
qtc [id number]

## 2022-09-12 ENCOUNTER — TELEPHONE (OUTPATIENT)
Dept: FAMILY MEDICINE | Facility: CLINIC | Age: 52
End: 2022-09-12

## 2022-09-12 NOTE — TELEPHONE ENCOUNTER
I talked Ish is asymptomatic today.    Has PT and homecare coming in for further BP check  Veena Altman RN

## 2022-09-12 NOTE — TELEPHONE ENCOUNTER
General Call      Reason for Call:  High blood pressure    What are your questions or concerns:  Pia took pt's blood pressure today after pt took his medication and it was 162-88.     Pia also stated if [place] and care team wish to change parameters on when home care updates they can call to update that, as there have been a lot of updates on pt recently.      Okay to leave a detailed message?: Yes at Other phone number:  [phone]  [facility]

## 2022-09-16 ENCOUNTER — TELEPHONE (OUTPATIENT)
Dept: FAMILY MEDICINE | Facility: CLINIC | Age: 52
End: 2022-09-16

## 2022-09-16 NOTE — TELEPHONE ENCOUNTER
Spoke with SAM Roman, gave verbal approval continued SN visits wkly as requested.  Update per Jessie- pt physically looks better. BP today 150/90. Compliant with meds as set up; compliant with exercises per PT/OT with slow progress. Continues to use wheelchair, walker.   Mentally pt still becomes weepy at times. Informed pt has psych VV 11-02-22, Jessie will F/U with pt, brother and/ or son on this to make sure they are aware and able to assist with VV.   Brother and son continue to be involved in pt care and supportive.  Forwarded to Abiola as KAREN.  JAELYN Olivares RN

## 2022-09-16 NOTE — TELEPHONE ENCOUNTER
General Call      Reason for Call:  Jessie from [facility] calling to give update. She saw pt today for skilled nursing visit, and is requesting continued weekly nurse visits for BP monitoring and med compliance for the remainder of his home care orders, approximately around 6 weeks. Will reassess after 6 weeks.      Okay to leave a detailed message?: Yes at Other phone number:  [phone]  Jessie - [facility]

## 2022-09-19 ENCOUNTER — TELEPHONE (OUTPATIENT)
Dept: FAMILY MEDICINE | Facility: CLINIC | Age: 52
End: 2022-09-19

## 2022-09-19 NOTE — TELEPHONE ENCOUNTER
Pia returned call  Gave verbal order to extend OT 1X week for four weeks  Please see other encounter today  Blood pressure 168/90 this am before medications  Reports he was asymptomatic and takes medications around 1100  Please okay orders  [phone] if questions/concerns    Poornima PROMISE Gordon RN on 9/19/2022 at 12:48 PM

## 2022-09-19 NOTE — TELEPHONE ENCOUNTER
Order/Referral Request    Who is requesting: [facility]    Orders being requested: Extention for previous orders-  1x a week x4 weeks       FYI:Blood pressure was 168 over 90 (9-19 AM) before taking BP meds this am         When are orders needed by: Verbal ASAP    Has this been discussed with Provider: Yes    Does patient have a preference on a Group/Provider/Facility? [facility]       Could we send this information to you in [place] or would you prefer to receive a phone call?:   Patient would prefer a phone call   Okay to leave a detailed message?: Yes at Other phone number:  [phone]

## 2022-09-21 NOTE — TELEPHONE ENCOUNTER
Reviewed and noted, will continue to monitor patient's progress.    Abiola Joseph, RASHEL, APRN-CNP

## 2022-09-22 ENCOUNTER — TELEPHONE (OUTPATIENT)
Dept: FAMILY MEDICINE | Facility: CLINIC | Age: 52
End: 2022-09-22

## 2022-09-22 NOTE — TELEPHONE ENCOUNTER
1) Spoke with SAM Roman, who reports BP per today visit 170/100. Previous days BP readings:  9/20- 158/84  09//90  9//90  9/12- 162/88  States taking amlodipine, metoprolol as prescribed.  Denies dizziness, lightheadedness, headache/ pressure. Jessie reports pt has been having some generalized muscle twitching, jerking which seems more pronounced. Jessie does not feel pt needs to be seen in ED for eval.     2) pt has stopped Citalopram 5 mg (10 mg- 0.5 tab) on own as states makes him feel like he's in fog, restless, sleeplessness, upset stomach. Pt takes in AM.    Forwarded to Abiola for review, recommendations.  Do you want to see in clinic or VV OK?   JAELYN Olivares RN

## 2022-09-22 NOTE — TELEPHONE ENCOUNTER
Reason for Call:  Home Care Visit and Questions     Detailed comments:   1) B/P 170/100 today higher then last week  Pt has not missed any meds or denies any symptoms.  2) Pt quite taking Antidepressant 9/21/22  due to how it makes him feel. The medication is Citalopram. Stomach upset, Restless, sleepless and mental Fog.    3) Pt also is having Jittery muscle movement- This started when he had a stroke getting worse. Please Advise     Phone Number Home Care  can be reached at:   [facility] [phone]    Best Time: Any Time      Can we leave a detailed message on this number? YES    Call taken on 9/22/2022 at 2:20 PM by Chandrika Blair

## 2022-09-23 NOTE — TELEPHONE ENCOUNTER
If patient cannot get into the clinic, would prefer in person.  Otherwise, okay for virtual.    Thanks,  Abiola Joseph, DNP, APRN-CNP

## 2022-09-23 NOTE — TELEPHONE ENCOUNTER
Spoke with Wm, son, informed of homecare nurse report and Abiola's recommendations for OV.  Appt scheduled 10/03 with Abiola. Pt's brother will transport. Advised to have pt bring med bottles with to appt.  LM on confidential VM SAM Roman, with above information. To call care team with any further concerns.  JAELYN Olivares RN

## 2022-09-28 ENCOUNTER — TELEPHONE (OUTPATIENT)
Dept: FAMILY MEDICINE | Facility: CLINIC | Age: 52
End: 2022-09-28

## 2022-09-29 ENCOUNTER — TELEPHONE (OUTPATIENT)
Dept: FAMILY MEDICINE | Facility: CLINIC | Age: 52
End: 2022-09-29

## 2022-09-29 NOTE — TELEPHONE ENCOUNTER
"Pt was called, he denies thoughts of hurting or killing himself or others. He said he \"just feels empty.\" He says he is unable to get disability paperwork finished, he doesn't feel like the antidepressants are helping. Pt was advised to keep taking the antidepressants as they need a while to work. Pt says he can't work, he doesn't have any money, he doesn't have any family members to help with paperwork. Pt was advised to to to ER if he does have thoughts of hurting or killing himself. Message forwarded to PCP as GIORGIO Campbell RN     "

## 2022-09-29 NOTE — TELEPHONE ENCOUNTER
Reason for Call:  OT FYI Visit today     Detailed comments:   1) Pt had B/P today 170/100 pt has been having a lot of readings lately. Pt has not taken his med's today. No other signs and symptoms related to B/P  2) Today very depressed crying uncontrollable, x-wife is trying to wean him off his depression medication. Just FYI as this happening. Pt has upcoming 10/3/22 WILDER Joseph Provider     Phone Number [facility] can be reached at:  [phone]*    Best Time: Any Time      Can we leave a detailed message on this number? YES    Call taken on 9/29/2022 at 12:16 PM by Chandrika Blair

## 2022-10-04 ENCOUNTER — TELEPHONE (OUTPATIENT)
Dept: FAMILY MEDICINE | Facility: CLINIC | Age: 52
End: 2022-10-04

## 2022-10-04 NOTE — TELEPHONE ENCOUNTER
FYI - Status Update    Who is Calling: Pia OT therapist     Update: High BP- was 168 over 90 with no medication take yet.     Does caller want a call/response back: No

## 2022-10-12 ENCOUNTER — TELEPHONE (OUTPATIENT)
Dept: FAMILY MEDICINE | Facility: CLINIC | Age: 52
End: 2022-10-12

## 2022-10-12 NOTE — TELEPHONE ENCOUNTER
Nanette calling to report patient's blood pressure today = 186/112. P - 70.  States patient's blood pressure usually runs high  Patient is asymptomatic      To provider to advise    Galilea WINSLOW, RN

## 2022-10-12 NOTE — TELEPHONE ENCOUNTER
Please verify if patient has been taking his medications. If he is please have him increase his Metoprolol to 50 mg two times daily and update med list to reflect.     Thanks,  Abiola Joseph, RASHEL, APRN-CNP

## 2022-10-13 NOTE — TELEPHONE ENCOUNTER
Spoke with Nanette, she said Salty had not taken his BP meds that day. Home care nursing is working with him to be more compliant and they will be sure to report his BP if elevated again at next visit this week.

## 2022-10-24 ENCOUNTER — MEDICAL CORRESPONDENCE (OUTPATIENT)
Dept: HEALTH INFORMATION MANAGEMENT | Facility: CLINIC | Age: 52
End: 2022-10-24

## 2022-10-27 ENCOUNTER — TELEPHONE (OUTPATIENT)
Dept: FAMILY MEDICINE | Facility: CLINIC | Age: 52
End: 2022-10-27

## 2022-10-27 NOTE — TELEPHONE ENCOUNTER
Reason for Call:  Home Care Verbal Order    Detailed comments:   Recert continued nurse visit for home care services  B/P elevated 158/92   Denies any hypertensive complaints.  Taking medication as per RX script  Virtual visit next week. Pt has discontinued Celexa and   Quetiapine     Please call [facility]  at [phone]    Best Time: Any Time      Can we leave a detailed message on this number? YES    Call taken on 10/27/2022 at 1:27 PM by Chandrika Blair

## 2022-11-01 ENCOUNTER — VIRTUAL VISIT (OUTPATIENT)
Dept: FAMILY MEDICINE | Facility: CLINIC | Age: 52
End: 2022-11-01
Payer: COMMERCIAL

## 2022-11-01 VITALS — HEART RATE: 54 BPM | DIASTOLIC BLOOD PRESSURE: 90 MMHG | SYSTOLIC BLOOD PRESSURE: 168 MMHG

## 2022-11-01 DIAGNOSIS — Z86.73 HISTORY OF MULTIPLE CEREBROVASCULAR ACCIDENTS (CVAS): Primary | ICD-10-CM

## 2022-11-01 DIAGNOSIS — F32.A DEPRESSION, UNSPECIFIED DEPRESSION TYPE: ICD-10-CM

## 2022-11-01 DIAGNOSIS — F19.10 SUBSTANCE ABUSE (H): ICD-10-CM

## 2022-11-01 DIAGNOSIS — I10 POORLY-CONTROLLED HYPERTENSION: ICD-10-CM

## 2022-11-01 DIAGNOSIS — I69.351 HEMIPARESIS AFFECTING RIGHT SIDE AS LATE EFFECT OF STROKE (H): ICD-10-CM

## 2022-11-01 PROCEDURE — 99443 PR PHYSICIAN TELEPHONE EVALUATION 21-30 MIN: CPT | Mod: 93 | Performed by: NURSE PRACTITIONER

## 2022-11-01 RX ORDER — CLONIDINE HYDROCHLORIDE 0.2 MG/1
0.2 TABLET ORAL 2 TIMES DAILY
Qty: 180 TABLET | Refills: 0 | Status: SHIPPED | OUTPATIENT
Start: 2022-11-01 | End: 2022-12-02

## 2022-11-01 RX ORDER — CLONIDINE HYDROCHLORIDE 0.1 MG/1
0.1 TABLET ORAL 2 TIMES DAILY
COMMUNITY
End: 2022-11-01

## 2022-11-01 NOTE — PROGRESS NOTES
Salty is a 51 year old who is being evaluated via a billable telephone visit.      What phone number would you like to be contacted at? [phone]  How would you like to obtain your AVS? Send in the mail    2:06 PM - 2:27 PM     Assessment & Plan     History of multiple cerebrovascular accidents (CVAs)  Patient with history of multiple CVAs, most recently in September 2022.  Patient has had home caring since.  Has had improvement in weakness and in mental health.  Just completed occupational therapy and physical therapy.  Continues to have weekly nurse visits.  Patient has not been taking Plavix, highly advised to restart this.  Home nurse did find in the drawer and will add to nightly medications.  Patient also researched medications to help post stroke and found that Etanercept was well researched and something given to patient's poststroke.  As this provider is not familiar with this antirheumatic medication, would have to do research.  Follow-up in 3 months for a recheck.    Hemiparesis affecting right side as late effect of stroke (H)  Chronic, secondary to multiple strokes as above.  Has had improvement through occupational and physical therapy.  Continue home stretches/strengthening.    Poorly-controlled hypertension  Continued poorly controlled hypertension, blood pressure at home today was 175/96 prior to taking medication as patient did not take until the nurse arrived.   Blood pressures otherwise remain above 150 over 90s on medication.  Would recommend increasing clonidine to 0.2 mg twice daily and home care nurse to weekly on blood pressures.  - cloNIDine (CATAPRES) 0.2 MG tablet; Take 1 tablet (0.2 mg) by mouth 2 times daily    Depression, unspecified depression type  Chronic, somewhat improved.  Patient stopped taking Lexapro and citalopram as well as Seroquel as he is having side effects.  Started taking 5 HTP 2 times daily which patient states has been helpful for his moods.  Okay to  "continue.    Substance abuse (H)  Per SAM Roman there is no evidence that the patient has been using recreational drugs such as Meth  Patient states he has not used drugs in a couple of months                 BMI:   Estimated body mass index is 26.11 kg/m  as calculated from the following:    Height as of 2/2/22: 1.778 m (5' 10\").    Weight as of 4/6/22: 82.6 kg (182 lb).       See Patient Instructions    No follow-ups on file.    Abiola Joseph, RASHEL, APRN-CNP   [facility]    Lillian Pond is a 51 year old accompanied by his homecare nurse, presenting for the following health issues:  Hypertension and Medication Reconciliation (Patient is taking Clonidine 0.1 mg BID (RX from February), patient declines to take Celexa, Lexapro and Seroquel (states caused side effects) Patient is not taking Plavix)    Most history obtained from the nurse Nikki AG     Hypertension Follow-up      Do you check your blood pressure regularly outside of the clinic? Yes     Are you following a low salt diet? No    Are your blood pressures ever more than 140 on the top number (systolic) OR more   than 90 on the bottom number (diastolic), for example 140/90? Yes      How many servings of fruits and vegetables do you eat daily?  0-1    How many days per week do you exercise enough to make your heart beat faster? 3 or less    How many minutes a day do you exercise enough to make your heart beat faster? 9 or less    How many days per week do you miss taking your medication?     Wife sets up pills    Patient did not take his medications until the nurse arrived  Patient is taking Clonidine 0.1 mg BID (RX from February), patient declines to take Celexa, Lexapro and Seroquel - for about a month (states caused side effects) Patient is not taking Plavix    Just finished OT and PT  Nursing once weekly   Blood pressure about 1:20-1:30 -   Has been pretty consistent with taking medications   5 HTP two times daily - " seems to calm him down; not feeling as down or stressed    Last couple of days not sleeping the greatest, but has been okay prior to that  Having some hip pain - not liking to take      Etanercept? For post-stroke        Review of Systems   Constitutional, HEENT, cardiovascular, pulmonary, gi and gu systems are negative, except as otherwise noted.      Objective           Vitals:  BP (!) 168/90   Pulse 54    Vitals obtained at home from RN    Physical Exam   healthy, alert and no distress  PSYCH: Alert and oriented times 3; coherent speech, normal   rate and volume, able to articulate logical thoughts, able   to abstract reason, no tangential thoughts, no hallucinations   or delusions  His affect is normal and pleasant  RESP: No cough, no audible wheezing, able to talk in full sentences  Remainder of exam unable to be completed due to telephone visits    Diagnostic Test Results:  none            Phone call duration: 21 minutes

## 2022-12-01 ENCOUNTER — TELEPHONE (OUTPATIENT)
Dept: FAMILY MEDICINE | Facility: CLINIC | Age: 52
End: 2022-12-01

## 2022-12-01 DIAGNOSIS — I10 POORLY-CONTROLLED HYPERTENSION: ICD-10-CM

## 2022-12-01 NOTE — TELEPHONE ENCOUNTER
"Jessie from [facility] called to say that pt BP was 160/100 today, pt has not taken he medication for 4 days as \"he didn't feel like it.\" Pt has no symptoms of HA, chest pain, vision problems. Medications were set up by home care RN and she will continue to do that weekly. Message forwarded to PCP as GIORGIO Campbell RN     "

## 2022-12-02 RX ORDER — CLONIDINE HYDROCHLORIDE 0.2 MG/1
TABLET ORAL
Qty: 180 TABLET | Refills: 0 | Status: SHIPPED | OUTPATIENT
Start: 2022-12-02 | End: 2023-01-01

## 2022-12-02 NOTE — TELEPHONE ENCOUNTER
"Requested Prescriptions   Pending Prescriptions Disp Refills     cloNIDine (CATAPRES) 0.2 MG tablet [Pharmacy Med Name: cloNIDine HCl 0.2 MG Oral Tablet] 180 tablet 0     Sig: Take 1 tablet by mouth twice daily       Central Acting Antiadrenergic Agents Failed - 12/1/2022 11:24 AM        Failed - Blood pressure under 140/90 in past 12 months     BP Readings from Last 3 Encounters:   11/01/22 (!) 168/90   08/15/22 (!) 152/90   04/06/22 (!) 190/115                 Failed - Normal serum creatinine on file within past 12 months     Recent Labs   Lab Test 08/15/22  1203   CR 1.78*       Ok to refill medication if creatinine is low          Passed - Patient is 6 years of age or older        Passed - Recent (12 mo) or future (30 days) visit within the authorizing provider's specialty     Patient has had an office visit with the authorizing provider or a provider within the authorizing providers department within the previous 12 mos or has a future within next 30 days. See \"Patient Info\" tab in [facility], or \"Choose Columns\" in Meds & Orders section of the refill encounter.              Passed - Medication is active on med list       Antiadrenergic Antihypertensives Failed - 12/1/2022 11:24 AM        Failed - Blood pressure less than 140/90 in past 6 months     BP Readings from Last 3 Encounters:   11/01/22 (!) 168/90   08/15/22 (!) 152/90   04/06/22 (!) 190/115                 Failed - Normal serum creatinine on file in past 12 months     Recent Labs   Lab Test 08/15/22  1203   CR 1.78*       Ok to refill medication if creatinine is low          Passed - Medication is active on med list        Passed - Patient is age 18 or older        Passed - Recent (6 mo) or future (30 days) visit within the authorizing provider's specialty     Patient had office visit in the last 6 months or has a visit in the next 30 days with authorizing provider or within the authorizing provider's specialty.  See \"Patient Info\" tab in [employer], or " "\"Choose Columns\" in Meds & Orders section of the refill encounter.                 "

## 2022-12-02 NOTE — TELEPHONE ENCOUNTER
Reviewed and noted.  Home care to continue monitoring and reporting as necessary.    Abiola Joseph, RASHEL, APRN-CNP

## 2022-12-05 ENCOUNTER — MEDICAL CORRESPONDENCE (OUTPATIENT)
Dept: HEALTH INFORMATION MANAGEMENT | Facility: CLINIC | Age: 52
End: 2022-12-05

## 2022-12-14 ENCOUNTER — TELEPHONE (OUTPATIENT)
Dept: FAMILY MEDICINE | Facility: CLINIC | Age: 52
End: 2022-12-14

## 2022-12-14 NOTE — TELEPHONE ENCOUNTER
KAREN    Home care nurse is at Salty's house and his BP today is 170/110-he has not taken his BP meds for 2 weeks. She made sure he took his meds today and has talked about the importance of taking them everyday. Advised to have Salty take his BP everyday and let us know if greater than 160/100. Home care nurse will call back in one week with an update and Salty will call sooner if needed

## 2022-12-26 ENCOUNTER — HEALTH MAINTENANCE LETTER (OUTPATIENT)
Age: 52
End: 2022-12-26

## 2022-12-27 ENCOUNTER — TELEPHONE (OUTPATIENT)
Dept: FAMILY MEDICINE | Facility: CLINIC | Age: 52
End: 2022-12-27

## 2022-12-27 NOTE — TELEPHONE ENCOUNTER
[facility] called to say pt BP is 180/104 p 62, he has no symptoms . Pt  has been taking his medications this week but he took today's medications  just before home care RN arrived. Jessie wanted Abiola Joseph CNP aware of BP and would like to be notified with any recommendations. Bella Campbell RN

## 2022-12-28 NOTE — TELEPHONE ENCOUNTER
Would really like to get an accurate blood pressure approximately 1 hour after taking blood pressure medications to determine any further changes or follow-up if we can accommodate this with home care.    Thanks,  Abiola Joseph, DNP, APRN-CNP

## 2022-12-29 NOTE — TELEPHONE ENCOUNTER
Writer called [facility] and updated of providers request,  She will do her best to accommodate this as pt does not always take meds at same time daily.  She will follow up after her visit with him next week with a reading and update the care team.    Ayala Mathew RN MSN

## 2022-12-30 DIAGNOSIS — Z53.9 DIAGNOSIS NOT YET DEFINED: Primary | ICD-10-CM

## 2022-12-30 PROCEDURE — G0179 MD RECERTIFICATION HHA PT: HCPCS | Performed by: NURSE PRACTITIONER

## 2023-01-01 ENCOUNTER — MEDICAL CORRESPONDENCE (OUTPATIENT)
Dept: HEALTH INFORMATION MANAGEMENT | Facility: CLINIC | Age: 53
End: 2023-01-01
Payer: COMMERCIAL

## 2023-01-01 ENCOUNTER — TELEPHONE (OUTPATIENT)
Dept: FAMILY MEDICINE | Facility: CLINIC | Age: 53
End: 2023-01-01
Payer: COMMERCIAL

## 2023-01-01 ENCOUNTER — HEALTH MAINTENANCE LETTER (OUTPATIENT)
Age: 53
End: 2023-01-01

## 2023-01-01 ENCOUNTER — TELEPHONE (OUTPATIENT)
Dept: FAMILY MEDICINE | Facility: CLINIC | Age: 53
End: 2023-01-01

## 2023-01-01 ENCOUNTER — OFFICE VISIT (OUTPATIENT)
Dept: FAMILY MEDICINE | Facility: CLINIC | Age: 53
End: 2023-01-01
Payer: COMMERCIAL

## 2023-01-01 ENCOUNTER — MEDICAL CORRESPONDENCE (OUTPATIENT)
Dept: HEALTH INFORMATION MANAGEMENT | Facility: CLINIC | Age: 53
End: 2023-01-01

## 2023-01-01 VITALS
TEMPERATURE: 97.9 F | OXYGEN SATURATION: 95 % | RESPIRATION RATE: 30 BRPM | WEIGHT: 192 LBS | BODY MASS INDEX: 27.55 KG/M2 | SYSTOLIC BLOOD PRESSURE: 218 MMHG | DIASTOLIC BLOOD PRESSURE: 136 MMHG | HEART RATE: 98 BPM

## 2023-01-01 DIAGNOSIS — Z53.9 DIAGNOSIS NOT YET DEFINED: Primary | ICD-10-CM

## 2023-01-01 DIAGNOSIS — F32.A DEPRESSION, UNSPECIFIED DEPRESSION TYPE: Primary | ICD-10-CM

## 2023-01-01 DIAGNOSIS — Z86.73 HISTORY OF MULTIPLE STROKES: ICD-10-CM

## 2023-01-01 DIAGNOSIS — F41.9 ANXIETY: ICD-10-CM

## 2023-01-01 DIAGNOSIS — F32.A DEPRESSION, UNSPECIFIED DEPRESSION TYPE: ICD-10-CM

## 2023-01-01 DIAGNOSIS — I10 POORLY-CONTROLLED HYPERTENSION: ICD-10-CM

## 2023-01-01 DIAGNOSIS — N18.32 STAGE 3B CHRONIC KIDNEY DISEASE (H): ICD-10-CM

## 2023-01-01 LAB
ALBUMIN UR-MCNC: >=300 MG/DL
ANION GAP SERPL CALCULATED.3IONS-SCNC: 12 MMOL/L (ref 7–15)
APPEARANCE UR: CLEAR
BILIRUB UR QL STRIP: NEGATIVE
BUN SERPL-MCNC: 40.1 MG/DL (ref 6–20)
CALCIUM SERPL-MCNC: 9.3 MG/DL (ref 8.6–10)
CHLORIDE SERPL-SCNC: 108 MMOL/L (ref 98–107)
COLOR UR AUTO: YELLOW
CREAT SERPL-MCNC: 3.37 MG/DL (ref 0.67–1.17)
CREAT UR-MCNC: 130.9 MG/DL
DEPRECATED HCO3 PLAS-SCNC: 20 MMOL/L (ref 22–29)
ERYTHROCYTE [DISTWIDTH] IN BLOOD BY AUTOMATED COUNT: 13.4 % (ref 10–15)
GFR SERPL CREATININE-BSD FRML MDRD: 21 ML/MIN/1.73M2
GLUCOSE SERPL-MCNC: 110 MG/DL (ref 70–99)
GLUCOSE UR STRIP-MCNC: NEGATIVE MG/DL
HCT VFR BLD AUTO: 49.1 % (ref 40–53)
HGB BLD-MCNC: 16.5 G/DL (ref 13.3–17.7)
HGB UR QL STRIP: ABNORMAL
KETONES UR STRIP-MCNC: NEGATIVE MG/DL
LEUKOCYTE ESTERASE UR QL STRIP: NEGATIVE
MCH RBC QN AUTO: 29.4 PG (ref 26.5–33)
MCHC RBC AUTO-ENTMCNC: 33.6 G/DL (ref 31.5–36.5)
MCV RBC AUTO: 87 FL (ref 78–100)
MICROALBUMIN UR-MCNC: 795.8 MG/L
MICROALBUMIN/CREAT UR: 607.94 MG/G CR (ref 0–17)
NITRATE UR QL: NEGATIVE
PH UR STRIP: 5.5 [PH] (ref 5–7)
PLATELET # BLD AUTO: 265 10E3/UL (ref 150–450)
POTASSIUM SERPL-SCNC: 3.9 MMOL/L (ref 3.4–5.3)
RBC # BLD AUTO: 5.62 10E6/UL (ref 4.4–5.9)
RBC #/AREA URNS AUTO: NORMAL /HPF
SODIUM SERPL-SCNC: 140 MMOL/L (ref 136–145)
SP GR UR STRIP: 1.02 (ref 1–1.03)
UROBILINOGEN UR STRIP-ACNC: 0.2 E.U./DL
WBC # BLD AUTO: 12.2 10E3/UL (ref 4–11)
WBC #/AREA URNS AUTO: NORMAL /HPF

## 2023-01-01 PROCEDURE — 99214 OFFICE O/P EST MOD 30 MIN: CPT | Performed by: NURSE PRACTITIONER

## 2023-01-01 PROCEDURE — G0179 MD RECERTIFICATION HHA PT: HCPCS | Performed by: NURSE PRACTITIONER

## 2023-01-01 PROCEDURE — 80048 BASIC METABOLIC PNL TOTAL CA: CPT | Performed by: NURSE PRACTITIONER

## 2023-01-01 PROCEDURE — 82043 UR ALBUMIN QUANTITATIVE: CPT | Performed by: NURSE PRACTITIONER

## 2023-01-01 PROCEDURE — 81001 URINALYSIS AUTO W/SCOPE: CPT | Performed by: NURSE PRACTITIONER

## 2023-01-01 PROCEDURE — 36415 COLL VENOUS BLD VENIPUNCTURE: CPT | Performed by: NURSE PRACTITIONER

## 2023-01-01 PROCEDURE — G0180 MD CERTIFICATION HHA PATIENT: HCPCS | Performed by: NURSE PRACTITIONER

## 2023-01-01 PROCEDURE — 82570 ASSAY OF URINE CREATININE: CPT | Performed by: NURSE PRACTITIONER

## 2023-01-01 PROCEDURE — 85027 COMPLETE CBC AUTOMATED: CPT | Performed by: NURSE PRACTITIONER

## 2023-01-01 RX ORDER — AMLODIPINE BESYLATE 10 MG/1
10 TABLET ORAL DAILY
Qty: 90 TABLET | Refills: 1 | Status: SHIPPED | OUTPATIENT
Start: 2023-01-01

## 2023-01-01 RX ORDER — ATORVASTATIN CALCIUM 40 MG/1
40 TABLET, FILM COATED ORAL EVERY EVENING
Qty: 90 TABLET | Refills: 3 | Status: SHIPPED | OUTPATIENT
Start: 2023-01-01

## 2023-01-01 RX ORDER — METOPROLOL SUCCINATE 50 MG/1
50 TABLET, EXTENDED RELEASE ORAL DAILY
Qty: 90 TABLET | Refills: 1 | Status: SHIPPED | OUTPATIENT
Start: 2023-01-01

## 2023-01-01 RX ORDER — CLONIDINE HYDROCHLORIDE 0.2 MG/1
0.2 TABLET ORAL 2 TIMES DAILY
Qty: 180 TABLET | Refills: 0 | Status: SHIPPED | OUTPATIENT
Start: 2023-01-01

## 2023-01-01 RX ORDER — CLOPIDOGREL BISULFATE 75 MG/1
75 TABLET ORAL DAILY
Qty: 90 TABLET | Refills: 3 | Status: SHIPPED | OUTPATIENT
Start: 2023-01-01

## 2023-01-01 RX ORDER — HYDROXYZINE HYDROCHLORIDE 25 MG/1
25 TABLET, FILM COATED ORAL 3 TIMES DAILY PRN
Qty: 30 TABLET | Refills: 0 | Status: SHIPPED | OUTPATIENT
Start: 2023-01-01

## 2023-01-01 RX ORDER — CLONIDINE HYDROCHLORIDE 0.2 MG/1
0.2 TABLET ORAL 2 TIMES DAILY
Qty: 180 TABLET | Refills: 0 | OUTPATIENT
Start: 2023-01-01

## 2023-01-01 RX ORDER — ESCITALOPRAM OXALATE 10 MG/1
10 TABLET ORAL DAILY
Qty: 90 TABLET | Refills: 0 | Status: SHIPPED | OUTPATIENT
Start: 2023-01-01

## 2023-01-01 RX ORDER — ESCITALOPRAM OXALATE 10 MG/1
10 TABLET ORAL DAILY
Qty: 90 TABLET | Refills: 0 | Status: SHIPPED | OUTPATIENT
Start: 2023-01-01 | End: 2023-01-01

## 2023-01-01 ASSESSMENT — PATIENT HEALTH QUESTIONNAIRE - PHQ9
SUM OF ALL RESPONSES TO PHQ QUESTIONS 1-9: 21
10. IF YOU CHECKED OFF ANY PROBLEMS, HOW DIFFICULT HAVE THESE PROBLEMS MADE IT FOR YOU TO DO YOUR WORK, TAKE CARE OF THINGS AT HOME, OR GET ALONG WITH OTHER PEOPLE: EXTREMELY DIFFICULT
SUM OF ALL RESPONSES TO PHQ QUESTIONS 1-9: 21

## 2023-01-06 ENCOUNTER — TELEPHONE (OUTPATIENT)
Dept: FAMILY MEDICINE | Facility: CLINIC | Age: 53
End: 2023-01-06

## 2023-01-06 NOTE — TELEPHONE ENCOUNTER
Reason for Call:  Home Health Care     Kathi with [facility] called regarding (reason for call): Attempted twice to complete a nursing visit for blood pressure monitoring, also medication set up. Pt Not answering phone, texts, or his door. History of non compliance for Pt. Wants to let Abiola Joseph know.      Phone Number Homecare Nurse can be reached at: [phone]    Can we leave a detailed message on this number? YES    Best Time: Anytime    Call taken on 1/6/2023 at 2:37 PM by MAIN Culver      .Abiola Gee PSC

## 2023-01-11 ENCOUNTER — TELEPHONE (OUTPATIENT)
Dept: FAMILY MEDICINE | Facility: CLINIC | Age: 53
End: 2023-01-11
Payer: COMMERCIAL

## 2023-01-11 NOTE — TELEPHONE ENCOUNTER
KAREN - Status Update    Who is Calling: [facility]    Update: Jessie wanted to inform Abiola that she visited pt today and his BP was at 190/115, he denied chest pain, headache, and blurred vision. Pt stated he has not taken his medication for 3 days as he has had an upset stomach. She could not get an updated BP check after med administration because pt refused to take medication. He said he will work on feeling better so he can start taking his meds again. Jessie stated pt has been very good on taking his medication in the AM and PM before this. She will try taking his BP again next week after med administration.

## 2023-01-17 NOTE — TELEPHONE ENCOUNTER
Thank you for the update. Please keep updated with blood pressure after medications and schedule follow up video visit to see if there needs to be any changes.     Thanks,  Abiola Joseph, DNP, APRN-CNP

## 2023-02-02 NOTE — TELEPHONE ENCOUNTER
FYI - Status Update    Who is Calling: nurse [facility]    Update: Kathi went out to see him this afternoon for medication set up and for his vitals. He has refused to take med's, so he hasn't shirlene taking them for the last two weeks. He also has an elevated BP of 190/105. Please call.    Does caller want a call/response back: Yes [phone] ok to leave detailed message.

## 2023-02-02 NOTE — TELEPHONE ENCOUNTER
Yee Lopes, agree with the plan. PCP to discuss case with family, patient and consider to file VA.     Dr Bradford

## 2023-02-02 NOTE — TELEPHONE ENCOUNTER
"Spoke with Kathi who is reporting ongoing med non- compliance, has not taken set- up meds at 2 wks; ongoing elevated /105 today.   Pt reasoning for not taking meds is that \"they don't help him to feel better.\"  No mention of sx HTN.  Pt denies suicidal/ homicidal ideation. States pt up in W/C when she arrived, states he had showered this AM, dressed. Doing own meal prep, no wt loss identified. Kathi not aware of who may be assisting pt with shopping, errands, finances as brother no longer involved. Has not communicated with Juan, son.  VA has not been filed, pt has been own decision maker.  States she will speak with SAM Roman case manager, to discuss if this is VA appropriate. Kathi to call care team back with decision to file VA.  This RN attempted to call pt, no answer. Unable to LM-  box full.  LM for Juan, son, to call clinic RN. Pt has appt with Abiola 02-22-23, will suggest sooner appt with another provider.   Forwarding encounter (see 01-19-23 phone enc also) to Dr. Bradford for review in PCP absence.  JAELYN Olivares RN    "

## 2023-02-07 NOTE — TELEPHONE ENCOUNTER
RN contacted [facility], for F/U.  States she discussed pt med non- compliance, elevated BP, concerns with CLAUDIA Roman, and homecare clinical manager. \[facility]\"" VA has been filed in past but no action taken as pt own decision maker. [facility] has not contacted family (son) re: concerns as [facility] family has not been involved.   Will continue wkly homecare visits with assessment, med set up, teaching, Will update PCP (or covering provider) as needed. Pt aware of appt with Abiola 02-22-23.  Left another message for son on identified VM to call care team with above update and again offering sooner appt with another provider in PCP absence to address uncontrolled BP.  Forwarded to Dr. Bradford as GIORGIO Olivares RN

## 2023-02-09 NOTE — TELEPHONE ENCOUNTER
No call back from son after 2nd message left. Has appt with Abiola 02-22-23.  JAELYN Olivares RN

## 2023-02-16 NOTE — TELEPHONE ENCOUNTER
Salma calling to give vitals update.     Patients pulse today was 103. Bloop pressure was 191 over 129.    Non compliance with medication. Has not take medication for 3 days now. Not experiencing any headaches or blurred visions.

## 2023-02-24 NOTE — TELEPHONE ENCOUNTER
General Call - Blood Pressure   Pt call was transferred to RN    Contacts       Type Contact Phone/Fax    02/24/2023 10:53 AM CST Phone (Incoming) [facility] (Home Care) [phone]        Reason for Call:  B/P Elevated     What are your questions or concerns:    1) Itching -Head-  Pt has not taken med for past 2 days.  2) B/P today 186/107 P 90    Date of last appointment with provider: 11/1/22 Virtual Visit WILDER Joseph     Could we send this information to you in [email or web address] or would you prefer to receive a phone call?:   Patient would prefer a phone call   Okay to leave a detailed message?: Yes at Other phone number:  [facility]

## 2023-02-24 NOTE — TELEPHONE ENCOUNTER
Jessie RN with [facility] is currently at pt's home.  States pt stopped taking all meds on Wednesday due to itching on head & not sleeping. States itching improves when he doesn't take the medications. Pt is not on any new meds.  BP today 186/107 p90. Pt denies HA, CP, blurred vision, palpitations.  RN did have pt take his morning meds today (aspirin, amlodipine, clonidine) & he states will start taking again. Pt had f/u HTN appt this week that was cancelled due to weather. appt was rescheduled for next Friday, 3/3/23.     Jessie also states she needs verbal orders to re-certify to continue visits for med management & compliance monitoring.   Verbal orders given per [facility] homecare policy.    Will route to provider to update.    Nasra Sr RN

## 2023-03-02 NOTE — TELEPHONE ENCOUNTER
Spoke with Kathi, [facility], who is reporting pt has not taken meds in past 2 wks, elevated BP. Kathi confirming pt has appt tomorrow 3/3 with Abiola. States pt's son is bringing him. Confirmed appt tomorrow with Abiola.  Forwarded to PCP for review.  JAELYN Olivares RN

## 2023-03-02 NOTE — TELEPHONE ENCOUNTER
General Call      Reason for Call:  Follow up Home care     What are your questions or concerns:  Pt receiving care for medications, and vitals monitoring and pt has declined to take his medications for the past 2 weeks and has an elevated bp of 165/95. Would like to discuss care.      Date of last appointment with provider: within the last year    Could we send this information to you in [employer] or would you prefer to receive a phone call?:   Patient would prefer a phone call   Okay to leave a detailed message?: Yes at Other phone number:  [phone]

## 2023-03-03 NOTE — PROGRESS NOTES
"Assessment & Plan     Depression, unspecified depression type  Chronic, uncontrolled. Feeling \"tired\" of everything. Affecting motivation and wanting to do anything or take medications. Hasn't taken medications in weeks. Blood pressure as below. Discussed trying to get moods under control to feel more motivated and moods lifted enough to take medications for blood pressure and to prevent stroke. Will start with Lexapro. Patient to take everyday. Discussed at minimum getting at least this pill in daily. Will work on the rest as below. Follow-up via telephone or virtual visit in 1 month.   - escitalopram (LEXAPRO) 10 MG tablet; Take 1 tablet (10 mg) by mouth daily    Anxiety  Anxiety up and down. Gets anxious and scared thinking of the big picture and what might happen. Discussed starting Lexapro as above. Patient can also take Hydroxyzine as needed for anxious episodes. This is something he will take only when he needs to help calm and relax.  - hydrOXYzine (ATARAX) 25 MG tablet; Take 1 tablet (25 mg) by mouth 3 times daily as needed for anxiety  - escitalopram (LEXAPRO) 10 MG tablet; Take 1 tablet (10 mg) by mouth daily    Stage 3b chronic kidney disease (H)  Chronic, recheck kidney functions.   - Albumin Random Urine Quantitative with Creat Ratio; Future  - UA reflex to Microscopic and Culture ([id number]); Future  - Basic metabolic panel  (Ca, Cl, CO2, Creat, Gluc, K, Na, BUN); Future  - CBC with platelets; Future    Poorly-controlled hypertension  Patient not taking blood pressure medications regularly. Blood pressure's significantly high. Discussed the risk of recurrent stroke and preventing from that and the importance of these medications to help lower blood pressure. Seeing as moods seem to be the underlying reason for not taking the medications will work on getting on an anti-depressant as above and then starting to take other medications more regularly. Home care helping out as much as able. Follow-up every 3 " months.                 Depression Screening Follow Up    PHQ 3/3/2023   PHQ-9 Total Score 21   Q9: Thoughts of better off dead/self-harm past 2 weeks Not at all   F/U: Thoughts of suicide or self-harm -   F/U: Self harm-plan -   F/U: Self-harm action -   F/U: Safety concerns -       Follow Up Actions Taken  Crisis resource information provided in After Visit Summary  Medications restarted, follow-up in 1 month     See Patient Instructions    No follow-ups on file.    Abiola Schumacher, DNP, APRN-CNP   [facility]     Salty Beasley is a 52 year old male who presents today for the following   health issues:  Here with son  HPI  Chief Complaint   Patient presents with     Hypertension     Depression     Medication Reconciliation     Not taking any medication at this time       Answers for HPI/ROS submitted by the patient on 3/3/2023  If you checked off any problems, how difficult have these problems made it for you to do your work, take care of things at home, or get along with other people?: Extremely difficult  PHQ9 TOTAL SCORE: 21  Do you check your blood pressure regularly outside of the clinic?: Yes  Are your blood pressures ever more than 140 on the top number (systolic) OR more than 90 on the bottom number (diastolic)? (For example, greater than 140/90): Yes  Are you following a low salt diet?: Yes  Do you take any over the counter pain medicine?  : No  How many servings of fruits and vegetables do you eat daily?: 2-3  On average, how many sweetened beverages do you drink each day (Examples: soda, juice, sweet tea, etc.  Do NOT count diet or artificially sweetened beverages)?: 1  How many minutes a day do you exercise enough to make your heart beat faster?: 9 or less  How many days a week do you exercise enough to make your heart beat faster?: 3 or less  How many days per week do you miss taking your medication?: 7    Last dose of medication was 1 week ago        Review of  Systems    Constitutional, HEENT, cardiovascular, pulmonary, gi and gu systems are negative, except as otherwise noted.    Objective   There were no vitals taken for this visit.  There is no height or weight on file to calculate BMI.    Physical Exam  GENERAL: healthy, alert and no distress, in wheelchair   NECK: no adenopathy, no asymmetry, masses, or scars and thyroid normal to palpation  RESP: lungs clear to auscultation - no rales, rhonchi or wheezes  CV: regular rate and rhythm, normal S1 S2, no S3 or S4, no murmur, click or rub, no peripheral edema and peripheral pulses strong  ABDOMEN: soft, nontender, no hepatosplenomegaly, no masses and bowel sounds normal  MS: no gross musculoskeletal defects noted, no edema  SKIN: no suspicious lesions or rashes  NEURO: Normal strength and tone, mentation intact and speech normal  PSYCH: mentation appears normal, affect flat, tearful and crying    Diagnostic Test Results:  Results for orders placed or performed in visit on 03/03/23   Albumin Random Urine Quantitative with Creat Ratio     Status: Abnormal   Result Value Ref Range    Creatinine Urine mg/dL 130.9 mg/dL    Albumin Urine mg/L 795.8 mg/L    Albumin Urine mg/g Cr 607.94 (H) 0.00 - 17.00 mg/g Cr   UA reflex to Microscopic and Culture ([id number])     Status: Abnormal    Specimen: Urine, Midstream   Result Value Ref Range    Color Urine Yellow Colorless, Straw, Light Yellow, Yellow    Appearance Urine Clear Clear    Glucose Urine Negative Negative mg/dL    Bilirubin Urine Negative Negative    Ketones Urine Negative Negative mg/dL    Specific Gravity Urine 1.025 1.003 - 1.035    Blood Urine Moderate (A) Negative    pH Urine 5.5 5.0 - 7.0    Protein Albumin Urine >=300 (A) Negative mg/dL    Urobilinogen Urine 0.2 0.2, 1.0 E.U./dL    Nitrite Urine Negative Negative    Leukocyte Esterase Urine Negative Negative   Basic metabolic panel  (Ca, Cl, CO2, Creat, Gluc, K, Na, BUN)     Status: Abnormal   Result Value Ref Range     Sodium 140 136 - 145 mmol/L    Potassium 3.9 3.4 - 5.3 mmol/L    Chloride 108 (H) 98 - 107 mmol/L    Carbon Dioxide (CO2) 20 (L) 22 - 29 mmol/L    Anion Gap 12 7 - 15 mmol/L    Urea Nitrogen 40.1 (H) 6.0 - 20.0 mg/dL    Creatinine 3.37 (H) 0.67 - 1.17 mg/dL    Calcium 9.3 8.6 - 10.0 mg/dL    Glucose 110 (H) 70 - 99 mg/dL    GFR Estimate 21 (L) >60 mL/min/1.73m2   CBC with platelets     Status: Abnormal   Result Value Ref Range    WBC Count 12.2 (H) 4.0 - 11.0 10e3/uL    RBC Count 5.62 4.40 - 5.90 10e6/uL    Hemoglobin 16.5 13.3 - 17.7 g/dL    Hematocrit 49.1 40.0 - 53.0 %    MCV 87 78 - 100 fL    MCH 29.4 26.5 - 33.0 pg    MCHC 33.6 31.5 - 36.5 g/dL    RDW 13.4 10.0 - 15.0 %    Platelet Count 265 150 - 450 10e3/uL   UA Microscopic with Reflex to Culture     Status: Normal   Result Value Ref Range    RBC Urine 0-2 0-2 /HPF /HPF    WBC Urine 0-5 0-5 /HPF /HPF    Narrative    Urine Culture not indicated        Chart documentation with Dragon Voice recognition Software. Although reviewed after completion, some words and grammatical errors may remain.

## 2023-03-03 NOTE — LETTER
March 16, 2023      Salty Beasley  [address]  [place] [address]        Dear ,    We are writing to inform you of your test results.    Your labs are indicating declining kidney disease/function, from the uncontrolled blood pressure. Blood count is stable.  We would like to encourage you to take your daily blood pressure medications. Please inform us if you started your antidepressant and if you are taking it regularly.     Thanks,   Abiola Joseph, DNP, APRN-CNP       Resulted Orders   Albumin Random Urine Quantitative with Creat Ratio   Result Value Ref Range    Creatinine Urine mg/dL 130.9 mg/dL      Comment:      The reference ranges have not been established in urine creatinine. The results should be integrated into the clinical context for interpretation.    Albumin Urine mg/L 795.8 mg/L      Comment:      The reference ranges have not been established in urine albumin. The results should be integrated into the clinical context for interpretation.    Albumin Urine mg/g Cr 607.94 (H) 0.00 - 17.00 mg/g Cr      Comment:      Microalbuminuria is defined as an albumin:creatinine ratio of 17 to 299 for males and 25 to 299 for females. A ratio of albumin:creatinine of 300 or higher is indicative of overt proteinuria.  Due to biologic variability, positive results should be confirmed by a second, first-morning random or 24-hour timed urine specimen. If there is discrepancy, a third specimen is recommended. When 2 out of 3 results are in the microalbuminuria range, this is evidence for incipient nephropathy and warrants increased efforts at glucose control, blood pressure control, and institution of therapy with an angiotensin-converting-enzyme (ACE) inhibitor (if the patient can tolerate it).     UA reflex to Microscopic and Culture ([id number])   Result Value Ref Range    Color Urine Yellow Colorless, Straw, Light Yellow, Yellow    Appearance Urine Clear Clear    Glucose Urine Negative Negative mg/dL     Bilirubin Urine Negative Negative    Ketones Urine Negative Negative mg/dL    Specific Gravity Urine 1.025 1.003 - 1.035    Blood Urine Moderate (A) Negative    pH Urine 5.5 5.0 - 7.0    Protein Albumin Urine >=300 (A) Negative mg/dL    Urobilinogen Urine 0.2 0.2, 1.0 E.U./dL    Nitrite Urine Negative Negative    Leukocyte Esterase Urine Negative Negative   Basic metabolic panel  (Ca, Cl, CO2, Creat, Gluc, K, Na, BUN)   Result Value Ref Range    Sodium 140 136 - 145 mmol/L    Potassium 3.9 3.4 - 5.3 mmol/L    Chloride 108 (H) 98 - 107 mmol/L    Carbon Dioxide (CO2) 20 (L) 22 - 29 mmol/L    Anion Gap 12 7 - 15 mmol/L    Urea Nitrogen 40.1 (H) 6.0 - 20.0 mg/dL    Creatinine 3.37 (H) 0.67 - 1.17 mg/dL    Calcium 9.3 8.6 - 10.0 mg/dL    Glucose 110 (H) 70 - 99 mg/dL    GFR Estimate 21 (L) >60 mL/min/1.73m2      Comment:      eGFR calculated using 2021 CKD-EPI equation.   CBC with platelets   Result Value Ref Range    WBC Count 12.2 (H) 4.0 - 11.0 10e3/uL    RBC Count 5.62 4.40 - 5.90 10e6/uL    Hemoglobin 16.5 13.3 - 17.7 g/dL    Hematocrit 49.1 40.0 - 53.0 %    MCV 87 78 - 100 fL    MCH 29.4 26.5 - 33.0 pg    MCHC 33.6 31.5 - 36.5 g/dL    RDW 13.4 10.0 - 15.0 %    Platelet Count 265 150 - 450 10e3/uL   UA Microscopic with Reflex to Culture   Result Value Ref Range    RBC Urine 0-2 0-2 /HPF /HPF    WBC Urine 0-5 0-5 /HPF /HPF    Narrative    Urine Culture not indicated       If you have any questions or concerns, please call the clinic at the number listed above.       Sincerely,      JACINTA Kay CNP

## 2023-03-03 NOTE — PATIENT INSTRUCTIONS
"Depression, unspecified depression type  Chronic, uncontrolled. Feeling \"tired\" of everything. Affecting motivation and wanting to do anything or take medications. Hasn't taken medications in weeks. Blood pressure as below. Discussed trying to get moods under control to feel more motivated and moods lifted enough to take medications for blood pressure and to prevent stroke. Will start with Lexapro. Patient to take everyday. Discussed at minimum getting at least this pill in daily. Will work on the rest as below. Follow-up via telephone or virtual visit in 1 month.   - escitalopram (LEXAPRO) 10 MG tablet; Take 1 tablet (10 mg) by mouth daily    Anxiety  Anxiety up and down. Gets anxious and scared thinking of the big picture and what might happen. Discussed starting Lexapro as above. Patient can also take Hydroxyzine as needed for anxious episodes. This is something he will take only when he needs to help calm and relax.  - hydrOXYzine (ATARAX) 25 MG tablet; Take 1 tablet (25 mg) by mouth 3 times daily as needed for anxiety  - escitalopram (LEXAPRO) 10 MG tablet; Take 1 tablet (10 mg) by mouth daily    Stage 3b chronic kidney disease (H)  Chronic, recheck kidney functions.   - Albumin Random Urine Quantitative with Creat Ratio; Future  - UA reflex to Microscopic and Culture ([id number]); Future  - Basic metabolic panel  (Ca, Cl, CO2, Creat, Gluc, K, Na, BUN); Future  - CBC with platelets; Future    Poorly-controlled hypertension  Patient not taking blood pressure medications regularly. Blood pressure's significantly high. Discussed the risk of recurrent stroke and preventing from that and the importance of these medications to help lower blood pressure. Seeing as moods seem to be the underlying reason for not taking the medications will work on getting on an anti-depressant as above and then starting to take other medications more regularly. Home care helping out as much as able. Follow-up every 3 months.   "
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
## 2023-04-04 NOTE — TELEPHONE ENCOUNTER
Reason for Call:  Home Health Care    Salty with [facility] called regarding (reason for call): Please call with verbal orders - OK to leave detailed message    Orders are needed for this patient.     Skilled Nursing: Pt is non-compliant with taking meds - BP today was 180/105.  Kathi spent time educating pt on the importance of taking meds. At pt's surprise visit last week,  pt seemed to be in a good mood and was outside and woman was there.  Pt was up and walking.  This week pt is weak, pain is 8/10 and pt is in his wheelchair.  Suspicion that pt is using drugs. Please call Kathi for further info and advise on moving forward with care.      Pt Provider: WILDER Joseph    Phone Number Homecare Nurse can be reached at: [phone]    Can we leave a detailed message on this number? YES    Phone number patient can be reached at: Home number on file [phone] (home)    Best Time: any    Call taken on 4/4/2023 at 2:22 PM by Yesika Medina

## 2023-04-10 NOTE — TELEPHONE ENCOUNTER
Update from home care nurse:    He started Rx in the last 4 days but forgot to take it today today-BP was 197/121    Denies any drug use

## 2023-04-24 NOTE — TELEPHONE ENCOUNTER
Called and spoke with Kathi from Home Care. Home care is currently seeing patient for re-certification. Has since being seen when was up and about, has been in bed or in his wheelchair. Still not usually taking his medications.   Will update provider after today's visit.     Abiola Joseph, RASHEL, APRN-CNP

## 2023-05-03 NOTE — TELEPHONE ENCOUNTER
Home Health Care    Reason for call:  Pia from [facility] calling for verbal orders and to report /110 around 1:40pm 5/3/2023 has not taken BP medication yet. No other symptoms and other vitals where normal.    Orders are needed for this patient.  OT: 1x4 with 2 PRN showers and home excerise program.     Phone Number Homecare Nurse can be reached at: [phone]    Can we leave a detailed message on this number? YES      Could we send this information to you in [employer] or would you prefer to receive a phone call?:   Patient would prefer a phone call   Okay to leave a detailed message?: Yes at Home number on file [phone] (home)

## 2023-05-08 NOTE — TELEPHONE ENCOUNTER
FYI - Status Update    Who is Calling: [facility]    Update: Wendy calling to report elevated blood pressure. It was at 170/115 today when she took it, this was before he took his medication. After walking around and some exercise his BP was at 189/116, this was 30 minutes after taking medication. No other symptoms.    Also wondering if Abiola could prescribe pt a muscle relaxer, pt stated his legs are tight and painful. Wendy did not know pt's preferred pharmacy.    Does caller want a call/response back: No, not unless there are any questions.

## 2023-05-08 NOTE — TELEPHONE ENCOUNTER
"Wendy. PTA, reports BP readings x2 today as noted below.   States Pt had not taken BP meds yet today, did take during visit. Unable to determine which days pt has taken meds due to med minder date \"off\"  but appears may have taken past 2 days. States pt asymptomatic for elevated BP and often BP readings are high.    States she has just started working with pt taking over for PT. States lt leg muscles tight, spasms. Pt rates sx 9/10. Notes spasming when walking. Feels pt would benefit from muscle relaxer however not sure how this would be monitored and dispensed. Does not have current pharm info if Rx ordered but can get this info.  Forwarded to Abiola for review, recommendations.   JAELYN Olivares RN    "

## 2023-05-08 NOTE — TELEPHONE ENCOUNTER
left message for Wendy to return call.  Is he symptomatic from high BP?  Tried to call [facility] for pharmacy and unable to leave a message    Veena Altman RN

## 2023-05-10 NOTE — TELEPHONE ENCOUNTER
I agree with concern of how this will be managed. Can patient do a telephone visit with the nurse so we can discuss further?    Thanks,  Abiola Joseph, DNP, APRN-CNP

## 2023-05-10 NOTE — TELEPHONE ENCOUNTER
Spoke with patient scheduled virtual phone visit with Abiola Joseph NP on 5/16/23 to assess elevated BP (per Abiola Joseph NP's request) and message left for [facility]. Julie Behrendt RN

## 2023-05-11 NOTE — TELEPHONE ENCOUNTER
Home Health Care    Reason for call:  B/P 186/132 Denied any symptoms,  Pt does have severe depression as trying  to hire  for Soc. Sec Benefits.  Pt had just taken his B/P pills before HH Nurse arrived.    Wendy said she is reporting this as pt may not come in.    Pt Provider: Abiola WORKMAN CNP     Phone Number Homecare Nurse can be reached at: Wendy [phone]  [facility]      Can we leave a detailed message on this number? YES

## 2023-05-11 NOTE — TELEPHONE ENCOUNTER
Wendy, PT with [facility] home care calling with update.  [facility] pt uses [facility].  Also [facility] home care nurse Jessie would be setting up meds.  Wendy was told pt is scheduled for VV with provider 5/16.    Routing to provider for update.    Nasra Sr RN

## 2023-05-11 NOTE — TELEPHONE ENCOUNTER
Spoke with SELENE Nguyen, who confirms elevated BP today as noted below, asymptomatic. Pt took BP meds just prior to visit. Appears pt took meds yeserday.   States pt is stressed due over denied social security benefits, trying to hire  to assist.   Questioned if pt has anyone helping with with as cty / SW or homecare SW. States she will check with RN case Jessie ac, on this. Also will see if RN can be present for 05-16-23 phone visit.   Await call back from RN.  Forwarded elevated BP reading to Latanya Olivares RN

## 2023-05-11 NOTE — TELEPHONE ENCOUNTER
Abiola,    Please see telephone note for update from home care.  Pt has upcoming appt 5/16/23.    Nasra Sr RN

## 2023-05-16 NOTE — TELEPHONE ENCOUNTER
Home care calling to give Abiola Joseph update.     Patients BP was elevated again today. Was at 205/116. Reports patient has not been taking medications. Denies any symptoms and refuses to be seen.     Wendy ([facility]) [phone]

## 2023-05-16 NOTE — TELEPHONE ENCOUNTER
Noted, please see if we can get patient rescheduled with me please.     Thanks,  Abiola Joseph, DNP, APRN-CNP

## 2023-05-19 NOTE — TELEPHONE ENCOUNTER
FYI - Status Update    Who is Calling: nursedinorah PTA from assisted living    Update: TAKING MEDS AGAIN BLOOD PRERSSURE 161/101 IS BETTER, HEART RATE LOW TODAY 49 AT REST UP TO 56 WITH WALKING NO SYMPTOMS    Does caller want a call/response back: No

## 2023-05-22 NOTE — TELEPHONE ENCOUNTER
Leana glad to hear, he can continue to take his medications at home.  Continue to keep updated as necessary.    Abiola Joseph, DNP, APRN-CNP

## 2023-05-22 NOTE — TELEPHONE ENCOUNTER
Blood pressures noted, is chronic and ongoing.  Always encourage patient to be seen in clinic or via virtual visit.    Abiola Joseph, RASHEL, APRN-CNP

## 2023-05-24 NOTE — TELEPHONE ENCOUNTER
SELENE Nguyen from [facility] calling to report patient not feeling well for the past couple of days. He is not sleeping, is nauseated. He has not taken his meds because of this. He is congested. He has increased swelling in both feet.   /122 P 79 Oxygen saturation 96 % afebrile.   He has not thrown up. Can't eat more than a couple of bites. He is drinking normal amounts of Gatorade and water, urinating normal amounts. No diarrhea or abdominal pain. Advised to try medications with dry soda crackers. BP now 195/105. Advised to go to urgent care or ER. Patient refuses. Home care nurse scheduled to reassess 05/31/23.   Will route to PCP as KAREN.  Poornima TAM RN

## 2023-05-24 NOTE — TELEPHONE ENCOUNTER
Noted, would advise if patient is not improving to be seen in the ER or urgent care.    Abiola Joseph, RASHEL, APRN-CNP

## 2023-05-25 NOTE — TELEPHONE ENCOUNTER
Pia with Occupational therapy calling. Calling to report Patients PB was 186/100 at 1pm today. Patient was advised to take blood pressure medication. Patient did take medication and Pia was leaving. Pia unable to follow up with reading today

## 2023-06-21 NOTE — TELEPHONE ENCOUNTER
Home Health Care    Reason for call:  [facility] nurse calling to give update to Abiola.    Patient was seen today for re certification. Patients BP was elevated to 180/95. Denying blurred vision and headaches. Has not taken meds in over 1 week as they do not make him feel good.     Pt Provider: Abiola Joseph    Phone Number Homecare Nurse can be reached at: [phone]    Can we leave a detailed message on this number? YES

## 2023-06-23 NOTE — TELEPHONE ENCOUNTER
Noted, home care to continue seeing patient.  Patient continues to be highly encouraged to take medications both by provider, son and home care.    Abiola Joseph, DNP, APRN-CNP

## 2023-07-14 NOTE — TELEPHONE ENCOUNTER
Reason for Call:  Home Health Care    Jef, RN with [facility] called regarding (reason for call): Verbal orders    Orders are needed for this patient.   Skilled Nursing: Nurse to see him 7/16 for start of care    Pt Provider: Abiola Joseph    Phone Number Homecare Nurse can be reached at:  [phone]    Can we leave a detailed message on this number? YES    Call taken on 7/14/2023 at 12:18 PM by Ioana Enriquez

## 2023-07-18 NOTE — TELEPHONE ENCOUNTER
Home Care is calling regarding an established patient with [facility].       Requesting orders from: Abiola Joseph  Provider is following patient: Yes  Is this a 60-day recertification request?  No    Orders Requested    Physical Therapy  Request for initial certification (first set of orders)   Frequency:  1x/wk for 3 wks which includes today's initial assessment. Working on weakness and falls risk    Confirmed ok to leave a detailed message with call back.  Contact information confirmed and updated as needed.    Poornima Black RN

## 2023-07-20 NOTE — TELEPHONE ENCOUNTER
"Pia OT with [facility] called for orders and to report elevated BP. She said pt is intermittently noncompliant with taking his medications. BP today was 178/110, he told Pia that \"someone told me not to take them\" Home care RN is seeing him this afternoon and will deal with his noncompliance and recheck BP./       Home Care is calling regarding an established patient with [facility].       Requesting orders from: Abiola Joseph  Provider is following patient: Yes  Is this a 60-day recertification request?  No    Orders Requested    Occupational Therapy  Request for initial certification (first set of orders)   Frequency:  1x/wk for 4 wks for home exercise program, general safety.       Information was gathered and will be sent to provider for review.  RN will contact Home Care with information after provider review.  Confirmed ok to leave a detailed message with call back.  Contact information confirmed and updated as needed.    Bella Campbell RN    "

## 2023-07-21 NOTE — TELEPHONE ENCOUNTER
Kathi with [facility] [phone] called and was informed of provider instructions below.    Kathi will try to get a hold of his emergency contacts to inform them of the message below.    Marlin Bradshaw RN on 7/21/2023 at 2:36 PM

## 2023-07-21 NOTE — TELEPHONE ENCOUNTER
"Spoke with Kathi, home care RN.  She saw pt yesterday. Has not spoken with pt since yesterday.  States pt's thinking was all over the place.  Pt non-compliant with all medications. RN states they had set up his meds 2 weeks before start of care & noted pt had not touched meds.   She states it is typical for pt to be non-compliant but the hallucinating is new since yesterday:  *Pt had 2 telemedicine appts scheduled this week with psych & behavior health & has been a no-show to both.    Statements pt made to home care RN yesterday:    1) Pt states people are coming into his house & telling him not to take his meds.   2) pt states he is currently going to dialysis.  3) pt states he vomited Wednesday due to too many Red Bulls, twinkies & cheesecakes & then while laughing,  told RN that he had abd pain of \"27/10\"   4)) pt now smoking marijuana 1-2x/day. Denies any other drug/alcohol use.  5) pt states recently bought 300 mag gun from his brother in law & has in his home. Per RN, Pt denied suicidal ideation yesterday.    This RN attempted to call pt but there was no answer & unable to leave a msg, mailbox full.   Attempted to call pt's brother Ashvin & there was no answer & unable to leave msg, mailbox full.  Called pt's son Eliot & was able to leave msg on voicemail to call the clinic back to discuss pt.    Per protocol on confusion/delerium & under 'very strange or paranoid behavior': pt should be advised to go to ER.    Spoke with SAM harrison again & she states she could not reach pt or other family members either.   Pt's next PT/OT visit is next Tuesday.  Next RN visit Thursday, 7/27 but Kathi said she could move his visit to Monday to be seen sooner.  She will also call her Case Mger: Jessie Espinoza to update. Saúl number: [phone]    Will route to provider & NB clinic pool    Nasra Sr RN      "

## 2023-07-21 NOTE — TELEPHONE ENCOUNTER
Provider covering for Abiola,    Please see telephone note with update from home care RN.  Left msg's with pt, pt's brother & pt's son with no answer.    Protocol advises pt be seen in ER.    Nasra Sr RN

## 2023-07-21 NOTE — TELEPHONE ENCOUNTER
Home Health Care    Reason for call:    Visit 7/20/21 for medication set up. Pt is non complainant   Hospital Care team not to take his medication. Some cony came out to his house from [facility].  Pt provided education on taking his medication.   Transferred pt to Nasra VARGAS     Pt Provider: Abiola Joseph     Phone Number Homecare Nurse can be reached at: Kathi Cruz  [phone]    Can we leave a detailed message on this number? YES    [facility]

## 2023-07-21 NOTE — TELEPHONE ENCOUNTER
Patient needs to be seen in Emergency Department for further evacuation.    Domonique Ramirez CNP      98.4

## 2023-07-21 NOTE — TELEPHONE ENCOUNTER
Son called back to clinic.  The son was given the information below about the concerns. The son reports the patient was recently at [facility] in the hospital last week and was released on Friday.  He knows the patient was given the medication until then. The son was not concerned about his behavior.  He states this is pretty common for him. The patient does have issues with drug addiction and is working on this. The patient is currently going to dialysis. The patient does not have any computer and does not know how to use it. His phone  was lost while in the hospital and he is working on getting him a new one. That could be why he did not do any tele visits.  The son reports there is no way his brother in law would sell him a gun.  He was not concerned about this .  The son did verify that he probably does have friends stopping by and telling him not to take his medicine.  The son reports he does have some friends that are not good for him.      The son believes he is ok and this typical behavior. The son states the HC RN can call him anytime and discuss his symptoms or even give her some of his history.    Left message on answering machine for patient HC RN to  to call back.    Thank you    Leisa HARRIS RN

## 2023-07-26 NOTE — TELEPHONE ENCOUNTER
Called left message on identified VM requesting she call care team back re: see below message from Dr. Moreno.    Julie Behrendt RN

## 2023-07-26 NOTE — TELEPHONE ENCOUNTER
Please verify with pt and with homecare that pt is taking his meds and repeat of BP will be happening

## 2023-07-27 NOTE — TELEPHONE ENCOUNTER
"Bing OT with [facility] returned call stated her last message was last week since then she has been out and his blood pressure was WNL.     RN CM with HC sets up all his meds, he is non compliant with taking his medications and often no show's for follow up appointments.    OT feels his depression/cognition plays a big part of his non compliance will tell HC staff \"people tell me not to take my medications\".    Advised appointment need with Abiola Joseph NP to follow up on his depression as he was a no show for his 6/9/23 appointment and has had 3 hospitalizations since then.     RN CM will follow up with patient to assist with scheduling clinic visit.    Julie Behrendt RN    "

## 2023-08-08 NOTE — TELEPHONE ENCOUNTER
Blood pressure noted. Actually better than normal. Would just encourage him to take his medications.   See if we can't get him scheduled to see me in clinic or at minimum a virtual visit.     Thanks,  Abiola Joseph, RASHEL, APRN-CNP

## 2023-08-08 NOTE — TELEPHONE ENCOUNTER
Molly with [facility] care calling. Patient has high BP during visit around 11am today (8/8) was 170/88. This was with out medications. Patient declined to taking medications today.     Can call with any questions- [phone]

## 2023-08-08 NOTE — TELEPHONE ENCOUNTER
Spoke with Molly, OT- informed/ advised as noted per Abiola. She will message nursing team to assist in scheduling appt.  States pt now on dialysis, states thinks he is following through with dialysis 3x/wk per pt report.   Discharging from OT today as has met max OT potential with pt lack of motivation and follow through.   PT was discontinued 8/4.   Forwarded to Abiola as GIORGIO Olivares RN

## 2023-08-15 NOTE — TELEPHONE ENCOUNTER
Sophy calling. Sophy is patients PCA. Sophy is calling to speak with Abiola Joseph about getting the patient set up with in home dialysis and what needs to be done for patient to receive this at home?     Also wanting to do over medications. Patient was discharged from [facility] and Sophy is needing clarification on patients medications.       Sophy - [phone]

## 2023-08-15 NOTE — TELEPHONE ENCOUNTER
Attempt to call, VM box full unable to LM.   Per 08-08-23  enc, appt with Abiola recommended but has not been scheduled.  JAELYN Olivares RN

## 2023-08-16 NOTE — TELEPHONE ENCOUNTER
Called patient received verbal consent to communicate with Sophy his PCA. Informed appointment needed to discuss home dialysis, appointment scheduled for 9/15/23 with Abiola Joseph NP.    Julie Behrendt RN

## 2023-08-18 NOTE — TELEPHONE ENCOUNTER
Jessie with [facility] care calling.     Patients BP was 185/112 today with a pulse of 92. Patient denies having Headaches or blurred vision.     Patient has not taken any medications since last Nurse visit on 8/8/23.    Can Call Jessie with [phone] with any questions

## 2023-08-22 NOTE — TELEPHONE ENCOUNTER
Please notify home caring to continue to encourage compliance with medications.  Patient is due for a follow-up visit with provider in office if we can help arrange this.    Thanks,  Abiola Joseph, RASHEL, APRN-CNP

## 2023-08-23 NOTE — TELEPHONE ENCOUNTER
Called Jessie VARGAS CM with [facility] left detailed message below from Abiola Joseph NP. Patient has appointment scheduled in clinic appointment on 9/15/23 at 0930 with Abiola Joseph NP.    Julie Behrendt RN

## 2023-08-25 NOTE — TELEPHONE ENCOUNTER
FYI - Status Update    Who is Calling: Kathi - [facility]    Update: Kathi calling from [facility] to inform Abiola that pt's BP was 168/98 this morning. Pt was not compiling with taking his medication as prescribed, Kathi checked his meds this morning and pt has been taking his medication the last 3 days. Will check again on Thursday at next visit.    Does caller want a call/response back: No

## 2023-09-12 NOTE — TELEPHONE ENCOUNTER
Home care nurse Jessie Calling.     Patient has not been taking BP meds for about 4 weeks now. Patients BP today was 180/110. Patient told Jessie that he is worried about taking medication due making his stomach upset and worried the medications could effect his kidneys negatively. Jessie states patient has not been going to dialysis.     Patient did agree to virtual visit with Abiola that home care can conduct.     Patient does have an in person visit on 9/15 with. Called patient to remind him. Spoke with patient who verbalized understanding of in person visit.     Jessie states that if he does not show for his visit Home care can help patient schedule a virtual visit for patient.     Ok to call Jessie with questions @ [phone]

## 2023-09-13 NOTE — TELEPHONE ENCOUNTER
Overdue for needed care. Please call to schedule. Once appt is scheduled, route back to care team.    Julie Behrendt RN

## 2023-09-22 NOTE — TELEPHONE ENCOUNTER
FYI - Status Update    Who is Calling:     Update: Reporting elevated blood pressure: 160/110. Pt says he is not going to take his medication, they set him up for 2 weeks and he has not taken any of his medications. Pt is also not going to dialysis as recommended.     Needing an alternative plan for pt. Possible new parameters for BP? Wondering what Abiola wants to do.      Does caller want a call/response back: Yes     Could we send this information to you in [employer] or would you prefer to receive a phone call?:   Patient would prefer a phone call   Okay to leave a detailed message?: Yes at Other phone number:  [phone]

## 2023-09-25 NOTE — TELEPHONE ENCOUNTER
We can certainly use that no parameters for blood pressure.  What are the current parameters?  Patient has a history of non-compliance. This can also be discussed at his upcoming appointment on October 13.    Thanks,  Abiola Joseph DNP, APRN-CNP

## 2023-10-09 NOTE — TELEPHONE ENCOUNTER
Spoke with Jessie VARGAS with [facility] who states patient continues to be non compliant with medication regimen and was refusing to go to dialysis. Patient feels he is ready for hospice now and hospice evaluation with [facility] is scheduled on 10/10/23.    Today's BP was 194/129 pulse 88 and fine crackles at base of LLL, patient not wanting any interventions.     Update sent to Abiola Joseph NP.    Julie Behrendt RN

## 2023-10-11 NOTE — TELEPHONE ENCOUNTER
Reviewed update.  If patient continues to be noncompliant and is ready for hospice, would agree.  Schedule office visit if needed.    Abiola Joseph, DNP, APRN-CNP